# Patient Record
Sex: FEMALE | Race: WHITE | Employment: PART TIME | URBAN - METROPOLITAN AREA
[De-identification: names, ages, dates, MRNs, and addresses within clinical notes are randomized per-mention and may not be internally consistent; named-entity substitution may affect disease eponyms.]

---

## 2017-01-05 NOTE — PATIENT DISCUSSION
(H11.153) Pinguecula, bilateral - Assesment : Examination revealed Pinguecula OU. - Plan : Monitor for changes. Advised patient to call our office with decreased vision or increased symptoms.

## 2017-01-05 NOTE — PATIENT DISCUSSION
(U82.221) Other secondary cataract, bilateral - Assesment : s/p yag cap OU. - Plan : Monitor for changes. Advised patient to call our office with decreased vision or an increase in symptoms. Keep scheduled appt.

## 2018-01-09 NOTE — PATIENT DISCUSSION
(X52.868) Keratoconjunct sicca, not specified as Sjogren's, bilateral - Assesment : Examination revealed Dry Eye Syndrome OU. - Plan : Recommend PF artificial tears 3-4 times daily OU. Monitor for changes. Advised patient to call our office with decreased vision or increased symptoms.

## 2018-01-09 NOTE — PATIENT DISCUSSION
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 Wear sunglasses when outdoors and eat green, leafy vegetables to maintain ocular health.   Return to clinic in one year for Macular OCT and Exam.

## 2018-01-09 NOTE — PATIENT DISCUSSION
(J75.243) Vitreous degeneration, bilateral - Assesment : Examination revealed PVD - Plan : Monitor for changes. Advised patient to call our office with decreased vision or an increase in flashes and/or floaters.

## 2018-01-09 NOTE — PATIENT DISCUSSION
(H35.372) Puckering of macula, left eye - Assesment : Examination revealed ERM. Stable - Plan : Monitor. Advised patient to call our office with decreased vision or increased symptoms.

## 2018-01-16 ENCOUNTER — HOSPITAL ENCOUNTER (OUTPATIENT)
Dept: MAMMOGRAPHY | Age: 43
Discharge: HOME | End: 2018-01-16
Attending: NURSE PRACTITIONER
Payer: MEDICARE

## 2018-01-16 DIAGNOSIS — K50.00 REGIONAL ENTERITIS OF SMALL INTESTINE (HCC): ICD-10-CM

## 2018-01-16 DIAGNOSIS — K90.89 OTHER INTESTINAL MALABSORPTION (CODE): ICD-10-CM

## 2018-01-16 DIAGNOSIS — K59.1 DIARRHEA, FUNCTIONAL: ICD-10-CM

## 2018-01-16 PROCEDURE — 77080 DXA BONE DENSITY AXIAL: CPT

## 2018-01-25 ENCOUNTER — HOSPITAL ENCOUNTER (EMERGENCY)
Age: 43
Discharge: HOME HEALTH | End: 2018-01-25
Attending: EMERGENCY MEDICINE
Payer: MEDICARE

## 2018-01-25 VITALS
WEIGHT: 143.74 LBS | RESPIRATION RATE: 16 BRPM | OXYGEN SATURATION: 96 % | HEART RATE: 98 BPM | SYSTOLIC BLOOD PRESSURE: 143 MMHG | TEMPERATURE: 98.7 F | DIASTOLIC BLOOD PRESSURE: 79 MMHG | HEIGHT: 63 IN | BODY MASS INDEX: 25.47 KG/M2

## 2018-01-25 DIAGNOSIS — L50.9 HIVES: Primary | ICD-10-CM

## 2018-01-25 PROCEDURE — 74011250637 HC RX REV CODE- 250/637: Performed by: PHYSICIAN ASSISTANT

## 2018-01-25 PROCEDURE — 96372 THER/PROPH/DIAG INJ SC/IM: CPT

## 2018-01-25 PROCEDURE — 74011250636 HC RX REV CODE- 250/636: Performed by: PHYSICIAN ASSISTANT

## 2018-01-25 PROCEDURE — 99283 EMERGENCY DEPT VISIT LOW MDM: CPT

## 2018-01-25 RX ORDER — FAMOTIDINE 20 MG/1
20 TABLET, FILM COATED ORAL 2 TIMES DAILY
Qty: 20 TAB | Refills: 0 | Status: SHIPPED | OUTPATIENT
Start: 2018-01-25 | End: 2018-03-23 | Stop reason: CLARIF

## 2018-01-25 RX ORDER — DIPHENHYDRAMINE HYDROCHLORIDE 50 MG/ML
50 INJECTION, SOLUTION INTRAMUSCULAR; INTRAVENOUS
Status: COMPLETED | OUTPATIENT
Start: 2018-01-25 | End: 2018-01-25

## 2018-01-25 RX ORDER — HYDROXYZINE 50 MG/1
50 TABLET, FILM COATED ORAL
Qty: 20 TAB | Refills: 0 | Status: SHIPPED | OUTPATIENT
Start: 2018-01-25 | End: 2018-02-04

## 2018-01-25 RX ORDER — FAMOTIDINE 20 MG/1
20 TABLET, FILM COATED ORAL
Status: COMPLETED | OUTPATIENT
Start: 2018-01-25 | End: 2018-01-25

## 2018-01-25 RX ADMIN — FAMOTIDINE 20 MG: 20 TABLET, FILM COATED ORAL at 19:05

## 2018-01-25 RX ADMIN — DIPHENHYDRAMINE HYDROCHLORIDE 50 MG: 50 INJECTION, SOLUTION INTRAMUSCULAR; INTRAVENOUS at 19:05

## 2018-01-25 NOTE — ED TRIAGE NOTES
Pt reports having hives all over her body x 2 days. Seen at PCP this AM and prescribed prednisone pack and it seemed like it got worse. Went back this afternoon and got a steroid shot and not any better. States it seems like it is getting worse. Reports similar episode about 2 yrs ago and they thought could be related to her crohn's.

## 2018-01-25 NOTE — LETTER
[facility] 
[facility] EMERGENCY DEPT 
[address] [id number] [phone] Work/School Note Date: 1/25/2018 To Whom It May concern: 
 
Flor Serra was seen and treated today in the emergency room by the following provider(s): 
Attending Provider: Caesar Gonzales MD 
Physician Assistant: CHANO Pemberton. Flor Serra may return to work on 1/29/18.  
 
Sincerely, 
 
 
 
 
CHANO Pemberton

## 2018-01-25 NOTE — ED PROVIDER NOTES
HPI Comments: 38 y.o. female with no significant past medical history who presents from home via private vehicle with chief complaint of itching hives. Pt reports that she has had itching hives for he past 3 days that have gradually worsened. She has seen her PCP twice today for her Sx with no relief. She reports that initially she was given a prescription for prednisone to help with her Sx that she picked up and took at home, but continued to feel that her Sx were worsening promptiung her to return to her PCP. On her second visit she was given an injection of steroids for her Sx. However, after returning home she continued to have persistent itching that prompted her to come to the ED for further evaluation. She reports that she has had similar hives in the past and definitive source was never able to be identified at that time. She denies new soaps or detergents, new foods, fever, chills, nausea, vomiting, abdominal pain, CP, SOB, difficulty breathing, tongue swelling. There are no other acute medical concerns at this time. Social hx: Pt denies smoking cigarettes, drinking alcohol, or using any illicit substances. PCP: Otoniel Worrell MD  Note written by tiffany Merritt, as dictated by Jose Rogers PA-C 7:05 PM          The history is provided by the patient. Past Medical History:   Diagnosis Date    Bipolar 2 disorder ([address]     Colitis, ulcerative chronic (HCC)     Crohn disease ([address]     Depression     Epilepsy ([address]     Pancreatitis 9/13/2010    Seizure ([address]     Ulcerative colitis ([address] 9/13/2010       Past Surgical History:   Procedure Laterality Date    FLEXIBLE SIGMOIDOSCOPY N/A 10/26/2016    FLEXIBLE SIGMOIDOSCOPY  performed by Claudia Weiner MD at [address] [facility] HX APPENDECTOMY       [facility]    with J pouch    HX TONSIL AND ADENOIDECTOMY      age 6         History reviewed. No pertinent family history.     Social History     Social History    Marital status: SINGLE     Spouse name: N/A    Number of children: N/A    Years of education: N/A     Occupational History    Not on file. Social History Main Topics    Smoking status: Never Smoker    Smokeless tobacco: Not on file    Alcohol use No    Drug use: No    Sexual activity: Not on file     Other Topics Concern    Not on file     Social History Narrative         ALLERGIES: Vancomycin    Review of Systems   Constitutional: Negative for appetite change, chills, fatigue and fever. HENT: Negative for congestion, ear pain, postnasal drip, rhinorrhea, sneezing and sore throat. Eyes: Negative for redness and visual disturbance. Respiratory: Negative for cough, shortness of breath and wheezing. Cardiovascular: Negative for chest pain, palpitations and leg swelling. Gastrointestinal: Negative for abdominal pain, anal bleeding, constipation, diarrhea, nausea and vomiting. Genitourinary: Negative for difficulty urinating, dysuria, frequency and hematuria. Musculoskeletal: Negative for arthralgias, back pain, myalgias and neck stiffness. Skin: Positive for rash. Allergic/Immunologic: Negative for immunocompromised state. Neurological: Negative for dizziness, syncope, weakness, light-headedness and headaches. Hematological: Negative for adenopathy. Vitals:    01/25/18 1814 01/25/18 1934   BP: 144/70 143/79   Pulse: (!) 104 98   Resp: 20 16   Temp: 98.7 °F (37.1 °C)    SpO2: 99% 96%   Weight: 65.2 kg (143 lb 11.8 oz)    Height: 5' 3\" (1.6 m)             Physical Exam   Constitutional: She is oriented to person, place, and time. She appears well-developed and well-nourished. No distress. HENT:   Head: Normocephalic and atraumatic. Right Ear: External ear normal.   Left Ear: External ear normal.   Eyes: EOM are normal. Pupils are equal, round, and reactive to light. Neck: Neck supple. Cardiovascular: Normal rate, regular rhythm, normal heart sounds and intact distal pulses.   Exam reveals no gallop and no friction rub. No murmur heard. Pulmonary/Chest: Effort normal and breath sounds normal. No stridor. No respiratory distress. She has no wheezes. She has no rales. She exhibits no tenderness. Abdominal: Soft. Bowel sounds are normal. She exhibits no distension and no mass. There is no tenderness. There is no rebound and no guarding. Musculoskeletal: Normal range of motion. She exhibits no edema, tenderness or deformity. Neurological: She is alert and oriented to person, place, and time. No cranial nerve deficit. Coordination normal.   Skin: Rash noted. There is erythema. No pallor. Diffuse randomly spread erythematous maculopapular eruption with rounded borders + blanching appearing in clusters + urticarial   Hands reddened. Pt rubbing hands together during exam. + large linear excoriations noted to forearms. Psychiatric: She has a normal mood and affect. Her behavior is normal.   Nursing note and vitals reviewed. MDM  Number of Diagnoses or Management Options  Hives:      Amount and/or Complexity of Data Reviewed  Review and summarize past medical records: yes  Independent visualization of images, tracings, or specimens: yes    Patient Progress  Patient progress: stable    ED Course       Procedures    6:53 PM  Discussed pt, sx, hx and current findings with Dr Cinthia Aguirre. He is in agreement with plan. Will give injection of benadryl, pepcid po and ice. Pt has received two doses of steroids prior to arrival. No other steroids to be given. Jennifer Coates. ASHLEY Humphreys      7:48 PM   Pt notes she is not much better after meds, but is ready to go home. Pt notes new areas popped up on forearms, but hands are no longer red. + large excoriations to rounded hives, pt denies scratching. Jennifer Coates. ASHLEY Humphreys    LABORATORY TESTS:  No results found for this or any previous visit (from the past 12 hour(s)). IMAGING RESULTS:    No results found.     MEDICATIONS GIVEN:  Medications diphenhydrAMINE (BENADRYL) injection 50 mg (50 mg IntraMUSCular Given 1/25/18 1905)   famotidine (PEPCID) tablet 20 mg (20 mg Oral Given 1/25/18 1905)       IMPRESSION:  1. Hives        PLAN:  1. Discharge Medication List as of 1/25/2018  7:48 PM      START taking these medications    Details   famotidine (PEPCID) 20 mg tablet Take 1 Tab by mouth two (2) times a day., Print, Disp-20 Tab, R-0      hydrOXYzine HCl (ATARAX) 50 mg tablet Take 1 Tab by mouth every six (6) hours as needed for Itching for up to 10 days. , Print, Disp-20 Tab, R-0         CONTINUE these medications which have NOT CHANGED    Details   buPROPion (WELLBUTRIN) 100 mg tablet Take 100 mg by mouth two (2) times a day., Historical Med      dextroamphetamine-amphetamine (ADDERALL) 10 mg tablet Take 10 mg by mouth two (2) times a day., Historical Med      QUEtiapine (SEROQUEL) 300 mg tablet Take 300 mg by mouth two (2) times a day., Historical Med      DULoxetine (CYMBALTA) 60 mg capsule Take 60 mg by mouth two (2) times a day.  , Historical Med      lamoTRIgine (LAMICTAL) 100 mg tablet Take 100 mg by mouth two (2) times a day.  , Historical Med      levetiracetam (KEPPRA) 100 mg/mL solution Take 10 mg/kg by mouth two (2) times a day. 1500 mg, Historical Med      adalimumab (HUMIRA) 40 mg/0.8 mL injection by SubCUTAneous route once. Every other week saturdays due 11/9/13, Historical Med      ondansetron (ZOFRAN ODT) 4 mg disintegrating tablet Take 1 Tab by mouth every eight (8) hours as needed for Nausea. , Print, Disp-10 Tab, R-0           2. Follow-up Information     Follow up With Details Comments Contact Info    Bailey Tim MD Schedule an appointment as soon as possible for a visit 2-4 days for recheck [address]  [phone]          Return to ED if worse       7:49 PM  Pt has been reexamined. Pt has no new complaints, changes or physical findings. Care plan outlined and precautions discussed.  All available results were reviewed with pt. All medications were reviewed with pt. All of pt's questions and concerns were addressed. Pt agrees to F/U as instructed and agrees to return to ED upon further deterioration. Pt is ready to go home.   CHANO Chen

## 2018-01-26 NOTE — ED NOTES
Patient discharged by provider Thuan Coles, [address]. Ambulatory from ED with friend. Steady gait.

## 2018-01-26 NOTE — DISCHARGE INSTRUCTIONS
Hives: Care Instructions  Your Care Instructions  Hives are raised, red, itchy patches of skin. They are also called wheals or welts. They usually have red borders and pale centers. Hives range in size from ¼ inch to 3 inches or more across. They may seem to move from place to place on the skin. Several hives may form a large area of raised, red skin. You can get hives after an insect sting, after taking medicine or eating certain foods, or because of infection or stress. Other causes include plants, things you breathe in, makeup, heat, cold, sunlight, and latex. You cannot spread hives to other people. Hives may last a few minutes or a few days, but a single spot may last less than 36 hours. Follow-up care is a key part of your treatment and safety. Be sure to make and go to all appointments, and call your doctor if you are having problems. It's also a good idea to know your test results and keep a list of the medicines you take. How can you care for yourself at home? · Avoid whatever you think may have caused your hives, such as a certain food or medicine. However, you may not know the cause. · Put a cool, wet towel on the area to relieve itching. · Take an over-the-counter antihistamine, such as diphenhydramine (Benadryl), cetirizine (Zyrtec), or loratadine (Claritin), to help stop the hives and calm the itching. Read and follow directions on the label. These medicines can make you feel sleepy. Do not drive while using them. · Stay away from strong soaps, detergents, and chemicals. These can make itching worse. When should you call for help? Call 911 anytime you think you may need emergency care. For example, call if:  ? · You have symptoms of a severe allergic reaction. These may include:  ¨ Sudden raised, red areas (hives) all over your body. ¨ Swelling of the throat, mouth, lips, or tongue. ¨ Trouble breathing. ¨ Passing out (losing consciousness).  Or you may feel very lightheaded or suddenly feel weak, confused, or restless. ?Call your doctor now or seek immediate medical care if:  ? · You have symptoms of an allergic reaction, such as:  ¨ A rash or hives (raised, red areas on the skin). ¨ Itching. ¨ Swelling. ¨ Belly pain, nausea, or vomiting. ? · You get hives after you start a new medicine. ? · Hives have not gone away after 24 hours. ? Watch closely for changes in your health, and be sure to contact your doctor if:  ? · You do not get better as expected. Where can you learn more? Go to http://shayan-jarek.info/. Enter U940 in the search box to learn more about \"Hives: Care Instructions. \"  Current as of: March 20, 2017  Content Version: 11.4  © 0497-7603 [employer]. Care instructions adapted under license by [employer] (which disclaims liability or warranty for this information). If you have questions about a medical condition or this instruction, always ask your healthcare professional. [place] [address] any warranty or liability for your use of this information. We hope that we have addressed all of your medical concerns. The examination and treatment you received in the Emergency Department were for an emergent problem and were not intended as complete care. It is important that you follow up with your healthcare provider(s) for ongoing care. If your symptoms worsen or do not improve as expected, and you are unable to reach your usual health care provider(s), you should return to the Emergency Department. Today's healthcare is undergoing tremendous change, and patient satisfaction surveys are one of the many tools to assess the quality of medical care. You may receive a survey from the [employer] organization regarding your experience in the Emergency Department. I hope that your experience has been completely positive, particularly the medical care that I provided.   As such, please participate in the survey; anything less than excellent does not meet my expectations or intentions. [facility] and [facility] participate in nationally recognized quality of care measures. If your blood pressure is greater than 120/80, as reported below, we urge that you seek medical care to address the potential of high blood pressure, commonly known as hypertension. Hypertension can be hereditary or can be caused by certain medical conditions, pain, stress, or \"white coat syndrome. \"       Please make an appointment with your health care provider(s) for follow up of your Emergency Department visit. VITALS:   Patient Vitals for the past 8 hrs:   Temp Pulse Resp BP SpO2   01/25/18 1934 - 98 16 143/79 96 %   01/25/18 1814 98.7 °F (37.1 °C) (!) 104 20 144/70 99 %          Thank you for allowing us to provide you with medical care today. We realize that you have many choices for your emergency care needs. Please choose us in the future for any continued health care needs. Jensen Humphreys, [employer].   Office: [phone]

## 2018-03-23 ENCOUNTER — HOSPITAL ENCOUNTER (EMERGENCY)
Age: 43
Discharge: HOME | End: 2018-03-23
Attending: STUDENT IN AN ORGANIZED HEALTH CARE EDUCATION/TRAINING PROGRAM
Payer: MEDICARE

## 2018-03-23 ENCOUNTER — APPOINTMENT (OUTPATIENT)
Dept: CT IMAGING | Age: 43
End: 2018-03-23
Attending: STUDENT IN AN ORGANIZED HEALTH CARE EDUCATION/TRAINING PROGRAM
Payer: MEDICARE

## 2018-03-23 VITALS
TEMPERATURE: 98.2 F | RESPIRATION RATE: 14 BRPM | OXYGEN SATURATION: 100 % | DIASTOLIC BLOOD PRESSURE: 68 MMHG | WEIGHT: 130 LBS | HEART RATE: 89 BPM | BODY MASS INDEX: 25.52 KG/M2 | SYSTOLIC BLOOD PRESSURE: 122 MMHG | HEIGHT: 60 IN

## 2018-03-23 DIAGNOSIS — R10.84 ABDOMINAL PAIN, GENERALIZED: Primary | ICD-10-CM

## 2018-03-23 DIAGNOSIS — R30.0 DYSURIA: ICD-10-CM

## 2018-03-23 LAB
ALBUMIN SERPL-MCNC: 4.3 G/DL (ref 3.5–5)
ALBUMIN/GLOB SERPL: 1.2 {RATIO} (ref 1.1–2.2)
ALP SERPL-CCNC: 67 U/L (ref 45–117)
ALT SERPL-CCNC: 25 U/L (ref 12–78)
ANION GAP SERPL CALC-SCNC: 9 MMOL/L (ref 5–15)
APPEARANCE UR: ABNORMAL
AST SERPL-CCNC: 17 U/L (ref 15–37)
BACTERIA URNS QL MICRO: ABNORMAL /HPF
BASOPHILS # BLD: 0 K/UL (ref 0–0.1)
BASOPHILS NFR BLD: 0 % (ref 0–1)
BILIRUB SERPL-MCNC: 0.3 MG/DL (ref 0.2–1)
BILIRUB UR QL: NEGATIVE
BUN SERPL-MCNC: 7 MG/DL (ref 6–20)
BUN/CREAT SERPL: 8 (ref 12–20)
CALCIUM SERPL-MCNC: 8.9 MG/DL (ref 8.5–10.1)
CHLORIDE SERPL-SCNC: 103 MMOL/L (ref 97–108)
CO2 SERPL-SCNC: 27 MMOL/L (ref 21–32)
COLOR UR: ABNORMAL
CREAT SERPL-MCNC: 0.93 MG/DL (ref 0.55–1.02)
DIFFERENTIAL METHOD BLD: ABNORMAL
EOSINOPHIL # BLD: 0.2 K/UL (ref 0–0.4)
EOSINOPHIL NFR BLD: 2 % (ref 0–7)
EPITH CASTS URNS QL MICRO: ABNORMAL /LPF
ERYTHROCYTE [DISTWIDTH] IN BLOOD BY AUTOMATED COUNT: 11.3 % (ref 11.5–14.5)
GLOBULIN SER CALC-MCNC: 3.5 G/DL (ref 2–4)
GLUCOSE SERPL-MCNC: 92 MG/DL (ref 65–100)
GLUCOSE UR STRIP.AUTO-MCNC: NEGATIVE MG/DL
HCG UR QL: NEGATIVE
HCT VFR BLD AUTO: 43.3 % (ref 35–47)
HGB BLD-MCNC: 14.7 G/DL (ref 11.5–16)
HGB UR QL STRIP: NEGATIVE
IMM GRANULOCYTES # BLD: 0 K/UL (ref 0–0.04)
IMM GRANULOCYTES NFR BLD AUTO: 0 % (ref 0–0.5)
KETONES UR QL STRIP.AUTO: NEGATIVE MG/DL
LEUKOCYTE ESTERASE UR QL STRIP.AUTO: ABNORMAL
LIPASE SERPL-CCNC: 58 U/L (ref 73–393)
LYMPHOCYTES # BLD: 2.5 K/UL (ref 0.8–3.5)
LYMPHOCYTES NFR BLD: 29 % (ref 12–49)
MAGNESIUM SERPL-MCNC: 1.9 MG/DL (ref 1.6–2.4)
MCH RBC QN AUTO: 32.2 PG (ref 26–34)
MCHC RBC AUTO-ENTMCNC: 33.9 G/DL (ref 30–36.5)
MCV RBC AUTO: 95 FL (ref 80–99)
MONOCYTES # BLD: 0.6 K/UL (ref 0–1)
MONOCYTES NFR BLD: 8 % (ref 5–13)
NEUTS SEG # BLD: 5.2 K/UL (ref 1.8–8)
NEUTS SEG NFR BLD: 61 % (ref 32–75)
NITRITE UR QL STRIP.AUTO: NEGATIVE
NRBC # BLD: 0 K/UL (ref 0–0.01)
NRBC BLD-RTO: 0 PER 100 WBC
PH UR STRIP: 7 [PH] (ref 5–8)
PLATELET # BLD AUTO: 351 K/UL (ref 150–400)
PMV BLD AUTO: 8.8 FL (ref 8.9–12.9)
POTASSIUM SERPL-SCNC: 4.2 MMOL/L (ref 3.5–5.1)
PROT SERPL-MCNC: 7.8 G/DL (ref 6.4–8.2)
PROT UR STRIP-MCNC: ABNORMAL MG/DL
RBC # BLD AUTO: 4.56 M/UL (ref 3.8–5.2)
RBC #/AREA URNS HPF: ABNORMAL /HPF (ref 0–5)
SODIUM SERPL-SCNC: 139 MMOL/L (ref 136–145)
SP GR UR REFRACTOMETRY: 1.02 (ref 1–1.03)
UR CULT HOLD, URHOLD: NORMAL
UROBILINOGEN UR QL STRIP.AUTO: 0.2 EU/DL (ref 0.2–1)
WBC # BLD AUTO: 8.5 K/UL (ref 3.6–11)
WBC URNS QL MICRO: ABNORMAL /HPF (ref 0–4)

## 2018-03-23 PROCEDURE — 81001 URINALYSIS AUTO W/SCOPE: CPT | Performed by: STUDENT IN AN ORGANIZED HEALTH CARE EDUCATION/TRAINING PROGRAM

## 2018-03-23 PROCEDURE — 87086 URINE CULTURE/COLONY COUNT: CPT | Performed by: STUDENT IN AN ORGANIZED HEALTH CARE EDUCATION/TRAINING PROGRAM

## 2018-03-23 PROCEDURE — 74177 CT ABD & PELVIS W/CONTRAST: CPT

## 2018-03-23 PROCEDURE — 85025 COMPLETE CBC W/AUTO DIFF WBC: CPT | Performed by: STUDENT IN AN ORGANIZED HEALTH CARE EDUCATION/TRAINING PROGRAM

## 2018-03-23 PROCEDURE — 36415 COLL VENOUS BLD VENIPUNCTURE: CPT | Performed by: STUDENT IN AN ORGANIZED HEALTH CARE EDUCATION/TRAINING PROGRAM

## 2018-03-23 PROCEDURE — 74011636320 HC RX REV CODE- 636/320: Performed by: RADIOLOGY

## 2018-03-23 PROCEDURE — 81025 URINE PREGNANCY TEST: CPT

## 2018-03-23 PROCEDURE — 80053 COMPREHEN METABOLIC PANEL: CPT | Performed by: STUDENT IN AN ORGANIZED HEALTH CARE EDUCATION/TRAINING PROGRAM

## 2018-03-23 PROCEDURE — 99284 EMERGENCY DEPT VISIT MOD MDM: CPT

## 2018-03-23 PROCEDURE — 74011250636 HC RX REV CODE- 250/636: Performed by: STUDENT IN AN ORGANIZED HEALTH CARE EDUCATION/TRAINING PROGRAM

## 2018-03-23 PROCEDURE — 83735 ASSAY OF MAGNESIUM: CPT | Performed by: STUDENT IN AN ORGANIZED HEALTH CARE EDUCATION/TRAINING PROGRAM

## 2018-03-23 PROCEDURE — 96360 HYDRATION IV INFUSION INIT: CPT

## 2018-03-23 PROCEDURE — 83690 ASSAY OF LIPASE: CPT | Performed by: STUDENT IN AN ORGANIZED HEALTH CARE EDUCATION/TRAINING PROGRAM

## 2018-03-23 RX ORDER — KETOROLAC TROMETHAMINE 10 MG/1
10 TABLET, FILM COATED ORAL
Qty: 12 TAB | Refills: 0 | Status: SHIPPED | OUTPATIENT
Start: 2018-03-23 | End: 2018-06-19

## 2018-03-23 RX ORDER — KETOROLAC TROMETHAMINE 30 MG/ML
15 INJECTION, SOLUTION INTRAMUSCULAR; INTRAVENOUS ONCE
Status: DISCONTINUED | OUTPATIENT
Start: 2018-03-23 | End: 2018-03-23 | Stop reason: HOSPADM

## 2018-03-23 RX ADMIN — IOPAMIDOL 94 ML: 755 INJECTION, SOLUTION INTRAVENOUS at 12:30

## 2018-03-23 RX ADMIN — SODIUM CHLORIDE 1000 ML: 900 INJECTION, SOLUTION INTRAVENOUS at 11:38

## 2018-03-23 NOTE — DISCHARGE INSTRUCTIONS
Painful Urination (Dysuria): Care Instructions  Your Care Instructions  Burning pain with urination (dysuria) is a common symptom of a urinary tract infection or other urinary problems. The bladder may become inflamed. This can cause pain when the bladder fills and empties. You may also feel pain if the tube that carries urine from the bladder to the outside of the body (urethra) gets irritated or infected. Sexually transmitted infections (STIs) also may cause pain when you urinate. Sometimes the pain can be caused by things other than an infection. The urethra can be irritated by soaps, perfumes, or foreign objects in the urethra. Kidney stones can cause pain when they pass through the urethra. The cause may be hard to find. You may need tests. Treatment for painful urination depends on the cause. Follow-up care is a key part of your treatment and safety. Be sure to make and go to all appointments, and call your doctor if you are having problems. It's also a good idea to know your test results and keep a list of the medicines you take. How can you care for yourself at home? · Drink extra water for the next day or two. This will help make the urine less concentrated. (If you have kidney, heart, or liver disease and have to limit fluids, talk with your doctor before you increase the amount of fluids you drink.)  · Avoid drinks that are carbonated or have caffeine. They can irritate the bladder. · Urinate often. Try to empty your bladder each time. For women:  · Urinate right after you have sex. · After going to the bathroom, wipe from front to back. · Avoid douches, bubble baths, and feminine hygiene sprays. And avoid other feminine hygiene products that have deodorants. When should you call for help? Call your doctor now or seek immediate medical care if:  ? · You have new symptoms, such as fever, nausea, or vomiting. ? · You have new or worse symptoms of a urinary problem.  For example:  Jm Bernard have blood or pus in your urine. ¨ You have chills or body aches. ¨ It hurts worse to urinate. ¨ You have groin or belly pain. ¨ You have pain in your back just below your rib cage (the flank area). ? Watch closely for changes in your health, and be sure to contact your doctor if you have any problems. Where can you learn more? Go to http://shayan-jarek.info/. Enter Z471 in the search box to learn more about \"Painful Urination (Dysuria): Care Instructions. \"  Current as of: May 12, 2017  Content Version: 11.4  © 3298-0205 [employer]. Care instructions adapted under license by [employer] (which disclaims liability or warranty for this information). If you have questions about a medical condition or this instruction, always ask your healthcare professional. [address] any warranty or liability for your use of this information. Abdominal Pain: Care Instructions  Your Care Instructions    Abdominal pain has many possible causes. Some aren't serious and get better on their own in a few days. Others need more testing and treatment. If your pain continues or gets worse, you need to be rechecked and may need more tests to find out what is wrong. You may need surgery to correct the problem. Don't ignore new symptoms, such as fever, nausea and vomiting, urination problems, pain that gets worse, and dizziness. These may be signs of a more serious problem. Your doctor may have recommended a follow-up visit in the next 8 to 12 hours. If you are not getting better, you may need more tests or treatment. The doctor has checked you carefully, but problems can develop later. If you notice any problems or new symptoms, get medical treatment right away. Follow-up care is a key part of your treatment and safety. Be sure to make and go to all appointments, and call your doctor if you are having problems.  It's also a good idea to know your test results and keep a list of the medicines you take. How can you care for yourself at home? · Rest until you feel better. · To prevent dehydration, drink plenty of fluids, enough so that your urine is light yellow or clear like water. Choose water and other caffeine-free clear liquids until you feel better. If you have kidney, heart, or liver disease and have to limit fluids, talk with your doctor before you increase the amount of fluids you drink. · If your stomach is upset, eat mild foods, such as rice, dry toast or crackers, bananas, and applesauce. Try eating several small meals instead of two or three large ones. · Wait until 48 hours after all symptoms have gone away before you have spicy foods, alcohol, and drinks that contain caffeine. · Do not eat foods that are high in fat. · Avoid anti-inflammatory medicines such as aspirin, ibuprofen (Advil, Motrin), and naproxen (Aleve). These can cause stomach upset. Talk to your doctor if you take daily aspirin for another health problem. When should you call for help? Call 911 anytime you think you may need emergency care. For example, call if:  ? · You passed out (lost consciousness). ? · You pass maroon or very bloody stools. ? · You vomit blood or what looks like coffee grounds. ? · You have new, severe belly pain. ?Call your doctor now or seek immediate medical care if:  ? · Your pain gets worse, especially if it becomes focused in one area of your belly. ? · You have a new or higher fever. ? · Your stools are black and look like tar, or they have streaks of blood. ? · You have unexpected vaginal bleeding. ? · You have symptoms of a urinary tract infection. These may include:  ¨ Pain when you urinate. ¨ Urinating more often than usual.  ¨ Blood in your urine. ? · You are dizzy or lightheaded, or you feel like you may faint. ? Watch closely for changes in your health, and be sure to contact your doctor if:  ? · You are not getting better after 1 day (24 hours). Where can you learn more? Go to http://shayan-jarek.info/. Enter C662 in the search box to learn more about \"Abdominal Pain: Care Instructions. \"  Current as of: March 20, 2017  Content Version: 11.4  © 5083-2060 [employer]. Care instructions adapted under license by [employer] (which disclaims liability or warranty for this information). If you have questions about a medical condition or this instruction, always ask your healthcare professional. [address] any warranty or liability for your use of this information.

## 2018-03-23 NOTE — ED TRIAGE NOTES
Has been on Macrobid x 6 days for UTI and no improvement, went to [facility] last night and changed to Cipro. Pain started radiating into back this morning and she is feeling worse.

## 2018-03-23 NOTE — ED PROVIDER NOTES
Patient is a 38 y.o. female presenting with urinary tract infection. The history is provided by the patient. Bladder Infection    This is a new problem. The current episode started more than 2 days ago (5 days ago). The problem occurs every urination. The problem has not changed since onset. The quality of the pain is described as burning. The pain is at a severity of 7/10. The pain is moderate. There has been no fever. Associated symptoms include nausea, frequency, hesitancy, urgency and flank pain (bilateral). Pertinent negatives include no chills, no vomiting, no hematuria and no possible pregnancy. She has tried antibiotics (Was on Macrobid since Sunday (5 days total), put on Cipro last night at [facility] for the symptoms. Her past medical history is significant for recurrent UTIs. Past medical history comments: Crohn's. Past Medical History:   Diagnosis Date    B[facility] 2 disorder ([facility]     Colitis, ulcerative chronic (HCC)     Crohn disease ([facility]     Depression     Epilepsy ([facility]     Pancreatitis 9/13/2010    Seizure ([facility]     Ulcerative colitis ([facility] [address] 9/13/2010       Past Surgical History:   Procedure Laterality Date    FLEXIBLE SIGMOIDOSCOPY N/A 10/26/2016    FLEXIBLE SIGMOIDOSCOPY  performed by Serge Merritt MD at [address] [facility] HX APPENDECTOMY       [facility]    with J pouch    HX TONSIL AND ADENOIDECTOMY      age 6         No family history on file. Social History     Social History    Marital status: SINGLE     Spouse name: N/A    Number of children: N/A    Years of education: N/A     Occupational History    Not on file. Social History Main Topics    Smoking status: Never Smoker    Smokeless tobacco: Not on file    Alcohol use No    Drug use: No    Sexual activity: Not on file     Other Topics Concern    Not on file     Social History Narrative         ALLERGIES: Vancomycin    Review of Systems   Constitutional: Negative for chills and fever.    HENT: Negative for sore throat. Respiratory: Negative for cough and shortness of breath. Cardiovascular: Negative for chest pain. Gastrointestinal: Positive for nausea. Negative for abdominal pain, constipation and vomiting. Genitourinary: Positive for dysuria, flank pain (bilateral), frequency, hesitancy and urgency. Negative for hematuria. Musculoskeletal: Positive for back pain. Skin: Negative for rash. Neurological: Negative for syncope and headaches. Psychiatric/Behavioral: Negative for confusion. All other systems reviewed and are negative. Vitals:    03/23/18 1100   BP: 121/60   Pulse: 89   Resp: 14   Temp: 98.2 °F (36.8 °C)   SpO2: 100%   Weight: 59 kg (130 lb)   Height: 5' (1.524 m)            Physical Exam   Constitutional: She is oriented to person, place, and time. She appears well-developed. No distress. HENT:   Head: Normocephalic and atraumatic. Eyes: Conjunctivae and EOM are normal. Pupils are equal, round, and reactive to light. Neck: Normal range of motion. Neck supple. Cardiovascular: Normal rate, regular rhythm and normal heart sounds. No murmur heard. Pulmonary/Chest: Effort normal and breath sounds normal. No respiratory distress. Abdominal: Soft. Bowel sounds are normal. She exhibits no distension. There is tenderness (mild, diffuse, nonfocal). There is no rebound. Mild bilaterally CVA TTP. Musculoskeletal: Normal range of motion. She exhibits no edema. Neurological: She is alert and oriented to person, place, and time. No cranial nerve deficit. She exhibits normal muscle tone. Coordination normal.   Skin: Skin is warm and dry. No rash noted. Psychiatric: She has a normal mood and affect. Her behavior is normal.   Nursing note and vitals reviewed.        MDM      ED Course       Procedures

## 2018-03-25 LAB
BACTERIA SPEC CULT: NORMAL
CC UR VC: NORMAL
SERVICE CMNT-IMP: NORMAL

## 2018-06-14 ENCOUNTER — HOSPITAL ENCOUNTER (OUTPATIENT)
Dept: CT IMAGING | Age: 43
Discharge: HOME | End: 2018-06-14
Attending: NURSE PRACTITIONER
Payer: MEDICARE

## 2018-06-14 DIAGNOSIS — K50.90 CROHN'S DISEASE (HCC): ICD-10-CM

## 2018-06-14 PROCEDURE — 74011636320 HC RX REV CODE- 636/320: Performed by: RADIOLOGY

## 2018-06-14 PROCEDURE — 74177 CT ABD & PELVIS W/CONTRAST: CPT

## 2018-06-14 RX ADMIN — IOPAMIDOL 98 ML: 755 INJECTION, SOLUTION INTRAVENOUS at 09:35

## 2018-06-19 ENCOUNTER — HOSPITAL ENCOUNTER (INPATIENT)
Age: 43
LOS: 5 days | Discharge: HOME | DRG: 386 | End: 2018-06-24
Attending: EMERGENCY MEDICINE | Admitting: INTERNAL MEDICINE
Payer: MEDICARE

## 2018-06-19 ENCOUNTER — HOSPITAL ENCOUNTER (OUTPATIENT)
Dept: GENERAL RADIOLOGY | Age: 43
Discharge: HOME | DRG: 386 | End: 2018-06-19
Attending: INTERNAL MEDICINE
Payer: MEDICARE

## 2018-06-19 DIAGNOSIS — K50.019 CROHN'S DISEASE OF SMALL INTESTINE WITH COMPLICATION (HCC): ICD-10-CM

## 2018-06-19 DIAGNOSIS — K50.90 CROHN DISEASE (HCC): ICD-10-CM

## 2018-06-19 DIAGNOSIS — R10.84 ABDOMINAL PAIN, GENERALIZED: Primary | ICD-10-CM

## 2018-06-19 PROBLEM — F32.A DEPRESSION: Chronic | Status: ACTIVE | Noted: 2018-06-19

## 2018-06-19 PROBLEM — G40.909 EPILEPSY (HCC): Chronic | Status: ACTIVE | Noted: 2018-06-19

## 2018-06-19 LAB
ANION GAP SERPL CALC-SCNC: 7 MMOL/L (ref 5–15)
BASOPHILS # BLD: 0 K/UL (ref 0–0.1)
BASOPHILS NFR BLD: 0 % (ref 0–1)
BUN SERPL-MCNC: 8 MG/DL (ref 6–20)
BUN/CREAT SERPL: 10 (ref 12–20)
CALCIUM SERPL-MCNC: 8.9 MG/DL (ref 8.5–10.1)
CHLORIDE SERPL-SCNC: 105 MMOL/L (ref 97–108)
CO2 SERPL-SCNC: 26 MMOL/L (ref 21–32)
CREAT SERPL-MCNC: 0.8 MG/DL (ref 0.55–1.02)
DIFFERENTIAL METHOD BLD: ABNORMAL
EOSINOPHIL # BLD: 0.2 K/UL (ref 0–0.4)
EOSINOPHIL NFR BLD: 2 % (ref 0–7)
ERYTHROCYTE [DISTWIDTH] IN BLOOD BY AUTOMATED COUNT: 11.3 % (ref 11.5–14.5)
GLUCOSE SERPL-MCNC: 89 MG/DL (ref 65–100)
HCT VFR BLD AUTO: 38.4 % (ref 35–47)
HGB BLD-MCNC: 13.3 G/DL (ref 11.5–16)
IMM GRANULOCYTES # BLD: 0 K/UL (ref 0–0.04)
IMM GRANULOCYTES NFR BLD AUTO: 0 % (ref 0–0.5)
LYMPHOCYTES # BLD: 2.9 K/UL (ref 0.8–3.5)
LYMPHOCYTES NFR BLD: 25 % (ref 12–49)
MCH RBC QN AUTO: 32 PG (ref 26–34)
MCHC RBC AUTO-ENTMCNC: 34.6 G/DL (ref 30–36.5)
MCV RBC AUTO: 92.3 FL (ref 80–99)
MONOCYTES # BLD: 0.8 K/UL (ref 0–1)
MONOCYTES NFR BLD: 7 % (ref 5–13)
NEUTS SEG # BLD: 7.6 K/UL (ref 1.8–8)
NEUTS SEG NFR BLD: 66 % (ref 32–75)
NRBC # BLD: 0 K/UL (ref 0–0.01)
NRBC BLD-RTO: 0 PER 100 WBC
PLATELET # BLD AUTO: 266 K/UL (ref 150–400)
PMV BLD AUTO: 9 FL (ref 8.9–12.9)
POTASSIUM SERPL-SCNC: 3.4 MMOL/L (ref 3.5–5.1)
RBC # BLD AUTO: 4.16 M/UL (ref 3.8–5.2)
SODIUM SERPL-SCNC: 138 MMOL/L (ref 136–145)
WBC # BLD AUTO: 11.6 K/UL (ref 3.6–11)

## 2018-06-19 PROCEDURE — 36415 COLL VENOUS BLD VENIPUNCTURE: CPT | Performed by: EMERGENCY MEDICINE

## 2018-06-19 PROCEDURE — 74011250637 HC RX REV CODE- 250/637: Performed by: INTERNAL MEDICINE

## 2018-06-19 PROCEDURE — 94762 N-INVAS EAR/PLS OXIMTRY CONT: CPT

## 2018-06-19 PROCEDURE — 77030027138 HC INCENT SPIROMETER -A

## 2018-06-19 PROCEDURE — 80048 BASIC METABOLIC PNL TOTAL CA: CPT | Performed by: EMERGENCY MEDICINE

## 2018-06-19 PROCEDURE — 74250 X-RAY XM SM INT 1CNTRST STD: CPT

## 2018-06-19 PROCEDURE — 74011250636 HC RX REV CODE- 250/636: Performed by: EMERGENCY MEDICINE

## 2018-06-19 PROCEDURE — 99282 EMERGENCY DEPT VISIT SF MDM: CPT

## 2018-06-19 PROCEDURE — 65270000029 HC RM PRIVATE

## 2018-06-19 PROCEDURE — 74011250636 HC RX REV CODE- 250/636: Performed by: INTERNAL MEDICINE

## 2018-06-19 PROCEDURE — 74011000258 HC RX REV CODE- 258: Performed by: EMERGENCY MEDICINE

## 2018-06-19 PROCEDURE — 85025 COMPLETE CBC W/AUTO DIFF WBC: CPT | Performed by: EMERGENCY MEDICINE

## 2018-06-19 RX ORDER — NALOXONE HYDROCHLORIDE 0.4 MG/ML
0.4 INJECTION, SOLUTION INTRAMUSCULAR; INTRAVENOUS; SUBCUTANEOUS AS NEEDED
Status: DISCONTINUED | OUTPATIENT
Start: 2018-06-19 | End: 2018-06-24 | Stop reason: HOSPADM

## 2018-06-19 RX ORDER — SODIUM CHLORIDE 0.9 % (FLUSH) 0.9 %
5-10 SYRINGE (ML) INJECTION AS NEEDED
Status: DISCONTINUED | OUTPATIENT
Start: 2018-06-19 | End: 2018-06-24 | Stop reason: HOSPADM

## 2018-06-19 RX ORDER — SODIUM CHLORIDE 9 MG/ML
125 INJECTION, SOLUTION INTRAVENOUS CONTINUOUS
Status: DISCONTINUED | OUTPATIENT
Start: 2018-06-19 | End: 2018-06-19

## 2018-06-19 RX ORDER — DICYCLOMINE HYDROCHLORIDE 20 MG/1
20 TABLET ORAL
Status: DISCONTINUED | OUTPATIENT
Start: 2018-06-19 | End: 2018-06-24 | Stop reason: HOSPADM

## 2018-06-19 RX ORDER — QUETIAPINE FUMARATE 100 MG/1
100 TABLET, FILM COATED ORAL
COMMUNITY
End: 2018-06-19

## 2018-06-19 RX ORDER — ONDANSETRON 2 MG/ML
4 INJECTION INTRAMUSCULAR; INTRAVENOUS
Status: DISCONTINUED | OUTPATIENT
Start: 2018-06-19 | End: 2018-06-24 | Stop reason: HOSPADM

## 2018-06-19 RX ORDER — SODIUM CHLORIDE 0.9 % (FLUSH) 0.9 %
5-10 SYRINGE (ML) INJECTION EVERY 8 HOURS
Status: DISCONTINUED | OUTPATIENT
Start: 2018-06-19 | End: 2018-06-24 | Stop reason: HOSPADM

## 2018-06-19 RX ORDER — DICYCLOMINE HYDROCHLORIDE 20 MG/1
20 TABLET ORAL
COMMUNITY

## 2018-06-19 RX ORDER — LEVOFLOXACIN 5 MG/ML
750 INJECTION, SOLUTION INTRAVENOUS
Status: DISCONTINUED | OUTPATIENT
Start: 2018-06-19 | End: 2018-06-19

## 2018-06-19 RX ORDER — BUPROPION HYDROCHLORIDE 150 MG/1
150 TABLET, EXTENDED RELEASE ORAL DAILY
Status: DISCONTINUED | OUTPATIENT
Start: 2018-06-20 | End: 2018-06-24 | Stop reason: HOSPADM

## 2018-06-19 RX ORDER — GLYCOPYRROLATE 1 MG/1
1 TABLET ORAL 2 TIMES DAILY
Status: DISCONTINUED | OUTPATIENT
Start: 2018-06-19 | End: 2018-06-24 | Stop reason: HOSPADM

## 2018-06-19 RX ORDER — CHOLECALCIFEROL (VITAMIN D3) 125 MCG
10000 CAPSULE ORAL DAILY
COMMUNITY

## 2018-06-19 RX ORDER — HYDROMORPHONE HYDROCHLORIDE 2 MG/ML
1 INJECTION, SOLUTION INTRAMUSCULAR; INTRAVENOUS; SUBCUTANEOUS
Status: DISCONTINUED | OUTPATIENT
Start: 2018-06-19 | End: 2018-06-19

## 2018-06-19 RX ORDER — ONDANSETRON 2 MG/ML
4 INJECTION INTRAMUSCULAR; INTRAVENOUS
Status: DISCONTINUED | OUTPATIENT
Start: 2018-06-19 | End: 2018-06-19

## 2018-06-19 RX ORDER — METRONIDAZOLE 500 MG/100ML
500 INJECTION, SOLUTION INTRAVENOUS
Status: DISCONTINUED | OUTPATIENT
Start: 2018-06-19 | End: 2018-06-19

## 2018-06-19 RX ORDER — DEXTROSE, SODIUM CHLORIDE, AND POTASSIUM CHLORIDE 5; .45; .15 G/100ML; G/100ML; G/100ML
100 INJECTION INTRAVENOUS CONTINUOUS
Status: DISCONTINUED | OUTPATIENT
Start: 2018-06-19 | End: 2018-06-23

## 2018-06-19 RX ORDER — DIPHENHYDRAMINE HYDROCHLORIDE 50 MG/ML
12.5 INJECTION, SOLUTION INTRAMUSCULAR; INTRAVENOUS
Status: DISCONTINUED | OUTPATIENT
Start: 2018-06-19 | End: 2018-06-24 | Stop reason: HOSPADM

## 2018-06-19 RX ORDER — HYDROMORPHONE HYDROCHLORIDE 1 MG/ML
1 INJECTION, SOLUTION INTRAMUSCULAR; INTRAVENOUS; SUBCUTANEOUS
Status: DISCONTINUED | OUTPATIENT
Start: 2018-06-19 | End: 2018-06-20

## 2018-06-19 RX ORDER — ACETAMINOPHEN 325 MG/1
650 TABLET ORAL
Status: DISCONTINUED | OUTPATIENT
Start: 2018-06-19 | End: 2018-06-24 | Stop reason: HOSPADM

## 2018-06-19 RX ORDER — BUPROPION HYDROCHLORIDE 150 MG/1
150 TABLET, EXTENDED RELEASE ORAL DAILY
COMMUNITY

## 2018-06-19 RX ORDER — GLYCOPYRROLATE 1 MG/1
1 TABLET ORAL 2 TIMES DAILY
COMMUNITY

## 2018-06-19 RX ADMIN — METHYLPREDNISOLONE SODIUM SUCCINATE 125 MG: 125 INJECTION, POWDER, FOR SOLUTION INTRAMUSCULAR; INTRAVENOUS at 20:24

## 2018-06-19 RX ADMIN — LEVETIRACETAM 750 MG: 100 SOLUTION ORAL at 21:34

## 2018-06-19 RX ADMIN — DEXTROSE MONOHYDRATE, SODIUM CHLORIDE, AND POTASSIUM CHLORIDE 100 ML/HR: 50; 4.5; 1.49 INJECTION, SOLUTION INTRAVENOUS at 21:34

## 2018-06-19 RX ADMIN — GLYCOPYRROLATE 1 MG: 1 TABLET ORAL at 22:28

## 2018-06-19 RX ADMIN — PIPERACILLIN SODIUM AND TAZOBACTAM SODIUM 3.38 G: 3; .375 INJECTION, POWDER, LYOPHILIZED, FOR SOLUTION INTRAVENOUS at 20:25

## 2018-06-19 RX ADMIN — HYDROMORPHONE HYDROCHLORIDE 1 MG: 2 INJECTION INTRAMUSCULAR; INTRAVENOUS; SUBCUTANEOUS at 20:24

## 2018-06-19 RX ADMIN — SODIUM CHLORIDE 125 ML/HR: 9 INJECTION, SOLUTION INTRAVENOUS at 20:24

## 2018-06-19 RX ADMIN — Medication 10 ML: at 21:35

## 2018-06-19 NOTE — IP AVS SNAPSHOT
[facility] 
 
 
 [address] [phone] Patient: Juani Higgins MRN: [id number] :1975 About your hospitalization You were admitted on:  2018 You last received care in the:  [facility] 5M1 MED SURG 1 You were discharged on:  2018 Why you were hospitalized Your primary diagnosis was:  Crohn's Regional Enteritis (Hcc) Your diagnoses also included:  Epilepsy (Hcc), Depression Follow-up Information Follow up With Details Comments Contact Info Jae Munroe MD Schedule an appointment as soon as possible for a visit in 1 week  [id number] [phone] Discharge Orders None A check chasidy indicates which time of day the medication should be taken. My Medications START taking these medications Instructions Each Dose to Equal  
 Morning Noon Evening Bedtime  
 levoFLOXacin 750 mg tablet Commonly known as:  Bosdanette Brojohn Your last dose was: Your next dose is: Take 1 Tab by mouth every twenty-four (24) hours for 5 days. 750 mg  
    
   
   
   
  
 metroNIDAZOLE 500 mg tablet Commonly known as:  FLAGYL Your last dose was: Your next dose is: Take 1 Tab by mouth every twelve (12) hours for 5 days. 500 mg  
    
   
   
   
  
 morphine IR 15 mg tablet Commonly known as:  MS IR Your last dose was: Your next dose is: Take 1 Tab by mouth every six (6) hours as needed. Max Daily Amount: 60 mg.  
 15 mg  
    
   
   
   
  
 predniSONE 20 mg tablet Commonly known as:  Lannis Rylan Your last dose was: Your next dose is: Take 2 tabs PO x 2 days then take 1.5 tabs PO x 2 days then take 1 tab PO x 2 days then take 0.5 tabs PO x 2 days CONTINUE taking these medications Instructions Each Dose to Equal  
 Morning Noon Evening Bedtime BENTYL 20 mg tablet Generic drug:  dicyclomine Your last dose was: Your next dose is: Take 20 mg by mouth four (4) times daily as needed. 20 mg  
    
   
   
   
  
 buPROPion  mg SR tablet Commonly known as:  Leigh Hidalgo Your last dose was: Your next dose is: Take 150 mg by mouth daily. 150 mg  
    
   
   
   
  
 cholecalciferol 5,000 unit capsule Commonly known as:  VITAMIN D3 Your last dose was: Your next dose is: Take 10,000 Units by mouth daily. 46845 Units  
    
   
   
   
  
 glycopyrrolate 1 mg tablet Commonly known as:  Yared Rdz Your last dose was: Your next dose is: Take 1 mg by mouth two (2) times a day. Indications: Diarrhea  
 1 mg HUMIRA 40 mg/0.8 mL injection Generic drug:  adalimumab Your last dose was: Your next dose is:    
   
   
 40 mg by SubCUTAneous route every fourteen (14) days. Every other Monday 40 mg  
    
   
   
   
  
 levETIRAcetam 100 mg/mL solution Commonly known as:  KEPPRA Your last dose was: Your next dose is: Take 750 mg by mouth two (2) times a day. 750 mg  
    
   
   
   
  
 MYRBETRIQ 50 mg ER tablet Generic drug:  mirabegron ER Your last dose was: Your next dose is: Take 50 mg by mouth daily. 50 mg Where to Get Your Medications Information on where to get these meds will be given to you by the nurse or doctor. ! Ask your nurse or doctor about these medications  
  levoFLOXacin 750 mg tablet  
 metroNIDAZOLE 500 mg tablet  
 morphine IR 15 mg tablet  
 predniSONE 20 mg tablet Opioid Education  Prescription Opioids: What You Need to Know: 
 
Prescription opioids can be used to help relieve moderate-to-severe pain and are often prescribed following a surgery or injury, or for certain health conditions. These medications can be an important part of treatment but also come with serious risks. Opioids are strong pain medicines. Examples include hydrocodone, oxycodone, fentanyl, and morphine. Heroin is an example of an illegal opioid. It is important to work with your health care provider to make sure you are getting the safest, most effective care. WHAT ARE THE RISKS AND SIDE EFFECTS OF OPIOID USE? Prescription opioids carry serious risks of addiction and overdose, especially with prolonged use. An opioid overdose, often marked by slow breathing, can cause sudden death. The use of prescription opioids can have a number of side effects as well, even when taken as directed. · Tolerance-meaning you might need to take more of a medication for the same pain relief · Physical dependence-meaning you have symptoms of withdrawal when the medication is stopped. Withdrawal symptoms can include nausea, sweating, chills, diarrhea, stomach cramps, and muscle aches. Withdrawal can last up to several weeks, depending on which drug you took and how long you took it. · Increased sensitivity to pain · Constipation · Nausea, vomiting, and dry mouth · Sleepiness and dizziness · Confusion · Depression · Low levels of testosterone that can result in lower sex drive, energy, and strength · Itching and sweating RISKS ARE GREATER WITH:      
· History of drug misuse, substance use disorder, or overdose · Mental health conditions (such as depression or anxiety) · Sleep apnea · Older age (72 years or older) · Pregnancy Avoid alcohol while taking prescription opioids. Also, unless specifically advised by your health care provider, medications to avoid include: · Benzodiazepines (such as Xanax or Valium) · Muscle relaxants (such as Soma or Flexeril) · Hypnotics (such as Ambien or Lunesta) · Other prescription opioids KNOW YOUR OPTIONS Talk to your health care provider about ways to manage your pain that don't involve prescription opioids. Some of these options may actually work better and have fewer risks and side effects. Options may include: 
· Pain relievers such as acetaminophen, ibuprofen, and naproxen · Some medications that are also used for depression or seizures · Physical therapy and exercise · Counseling to help patients learn how to cope better with triggers of pain and stress. · Application of heat or cold compress · Massage therapy · Relaxation techniques Be Informed Make sure you know the name of your medication, how much and how often to take it, and its potential risks & side effects. IF YOU ARE PRESCRIBED OPIOIDS FOR PAIN: 
· Never take opioids in greater amounts or more often than prescribed. Remember the goal is not to be pain-free but to manage your pain at a tolerable level. · Follow up with your primary care provider to: · Work together to create a plan on how to manage your pain. · Talk about ways to help manage your pain that don't involve prescription opioids. · Talk about any and all concerns and side effects. · Help prevent misuse and abuse. · Never sell or share prescription opioids · Help prevent misuse and abuse. · Store prescription opioids in a secure place and out of reach of others (this may include visitors, children, friends, and family). · Safely dispose of unused/unwanted prescription opioids: Find your community drug take-back program or your pharmacy mail-back program, or flush them down the toilet, following guidance from the Food and Drug Administration (www.fda.gov/Drugs/ResourcesForYou). · Visit www.cdc.gov/drugoverdose to learn about the risks of opioid abuse and overdose. · If you believe you may be struggling with addiction, tell your health care provider and ask for guidance or call [employer] at 7-324-327-SNMN. Discharge Instructions Patient Discharge Instructions Mickey Hdz / [id number] : 1975 Admitted 2018 Discharged: 2018 Primary Diagnoses Problem List as of 2018  Date Reviewed: 2018 Codes Class Noted - Resolved * (Principal)Crohn's regional enteritis [facility]) ICD-10-CM: K50.90 ICD-9-CM: 555.9  2018 - Present Epilepsy ([facility] (Chronic) ICD-10-CM: G40.909 ICD-9-CM: 345.90  2018 - Present Depression (Chronic) ICD-10-CM: F32.9 ICD-9-CM: 129  2018 - Present Ulcerative colitis ([facility] ICD-10-CM: K51.90 ICD-9-CM: 556.9  2010 - Present Pancreatitis ICD-10-CM: K85.90 ICD-9-CM: 152.4  2010 - Present Take Home Medications · It is important that you take the medication exactly as they are prescribed. · Keep your medication in the bottles provided by the pharmacist and keep a list of the medication names, dosages, and times to be taken in your wallet. · Do not take other medications without consulting your doctor. What to do at [facility] Recommended diet: Resume previous diet after 2 days of bland/soft diet post-discharge Recommended activity: Activity as tolerated If you experience worsening symptoms, please follow up with nearest ER. Follow-up with your PCP in 1 week Information obtained by : 
I understand that if any problems occur once I am at home I am to contact my physician. I understand and acknowledge receipt of the instructions indicated above. Physician's or R.N.'s Signature                                                                  Date/Time Patient or Representative Signature                                                          Date/Time Crohn's Disease: Care Instructions Your Care Instructions Crohn's disease is a lifelong inflammatory bowel disease (IBD). Parts of the digestive tract get swollen and irritated and may develop deep sores called ulcers. Crohn's disease usually occurs in the last part of the small intestine and the first part of the large intestine. But it can develop anywhere from the mouth to the anus. The main symptoms of Crohn's disease are belly pain, diarrhea, fever, and weight loss. Some people may have constipation. Crohn's disease also sometimes causes problems with the joints, eyes, or skin. Your symptoms may be mild at some times and severe at others. The disease can also go into remission, which means that it is not active and you have no symptoms. Bad attacks of Crohn's disease often have to be treated in the hospital so that you can get medicines and liquids through a tube in your vein, called an IV. This gives your digestive system time to rest and recover. Talk with your doctor about the best treatments for you. You may need medicines that help prevent or treat flare-ups of the disease. You may need surgery to remove part of your bowel if you have an abnormal opening in the bowel (fistula), an abscess, or a bowel obstruction. In some cases, surgery is needed if medicines do not work. But symptoms often return to other areas of the intestines after surgery. Learning good self-care can help you reduce your symptoms and manage Crohn's disease. Follow-up care is a key part of your treatment and safety. Be sure to make and go to all appointments, and call your doctor if you are having problems. It's also a good idea to know your test results and keep a list of the medicines you take. How can you care for yourself at home? · Take your medicines exactly as prescribed. Call your doctor if you think you are having a problem with your medicine. You will get more details on the specific medicines your doctor prescribes. · Do not take anti-inflammatory medicines, such as aspirin, ibuprofen (Advil, Motrin), or naproxen (Aleve). They may make your symptoms worse. Do not take any other medicines or herbal products without talking to your doctor first. 
· Avoid foods that make your symptoms worse. These might include milk, alcohol, high-fiber foods, or spicy foods. · Eat a healthy diet. Make sure to get enough iron. Rectal bleeding may make you lose iron. Good sources of iron include beef, lentils, spinach, raisins, and iron-enriched breads and cereals. · Drink liquid meal replacements if your doctor recommends them. These are high in calories and contain vitamins and minerals. Severe symptoms may make it hard for your body to absorb vitamins and minerals. · Do not smoke. Smoking makes Crohn's disease worse. If you need help quitting, talk to your doctor about stop-smoking programs and medicines. These can increase your chances of quitting for good. · Seek support from friends and family to help cope with Crohn's disease. The illness can affect all parts of your life. Get counseling if you need it. When should you call for help? Call 911 anytime you think you may need emergency care. For example, call if: 
? · Your stools are maroon or very bloody. ? · You passed out (lost consciousness). ?Call your doctor now or seek immediate medical care if: 
? · You are vomiting. ? · You have new or worse belly pain. ? · You have a fever. ? · You cannot pass stools or gas. ? · You have new or more blood in your stools. ? Watch closely for changes in your health, and be sure to contact your doctor if: 
? · You have new or worse symptoms. ? · You are losing weight. ? · You do not get better as expected. Where can you learn more? Go to http://shayan-jarek.info/. Enter 21  in the search box to learn more about \"Crohn's Disease: Care Instructions. \" Current as of: May 12, 2017 Content Version: 11.4 © 6489-9558 Healthwise, Incorporated. Care instructions adapted under license by [employer] (which disclaims liability or warranty for this information). If you have questions about a medical condition or this instruction, always ask your healthcare professional. [address] any warranty or liability for your use of this information. Introducing [facility]! Iona Palm introduces marshallindex patient portal. Now you can access parts of your medical record, email your doctor's office, and request medication refills online. 1. In your internet browser, go to [email or web address] 2. Click on the First Time User? Click Here link in the Sign In box. You will see the New Member Sign Up page. 3. Enter your marshallindex Access Code exactly as it appears below. You will not need to use this code after youve completed the sign-up process. If you do not sign up before the expiration date, you must request a new code. · marshallindex Access Code: [email or web address] Expires: 9/6/2018  8:20 AM 
 
4. Enter the last four digits of your Social Security Number (xxxx) and Date of Birth (mm/dd/yyyy) as indicated and click Submit. You will be taken to the next sign-up page. 5. Create a [employer] ID. This will be your marshallindex login ID and cannot be changed, so think of one that is secure and easy to remember. 6. Create a marshallindex password. You can change your password at any time. 7. Enter your Password Reset Question and Answer. This can be used at a later time if you forget your password. 8. Enter your e-mail address. You will receive e-mail notification when new information is available in [address] 9. Click Sign Up.  You can now view and download portions of your medical record. 10. Click the Download Summary menu link to download a portable copy of your medical information. If you have questions, please visit the Frequently Asked Questions section of the [employer] website. Remember, [employer] is NOT to be used for urgent needs. For medical emergencies, dial 911. Now available from your iPhone and Android! Introducing Jordon Nielsen As a [facility] patient, I wanted to make you aware of our electronic visit tool called Jordon Nielsen. [employer] 24/7 allows you to connect within minutes with a medical provider 24 hours a day, seven days a week via a mobile device or tablet or logging into a secure website from your computer. You can access Jordon Nielsen from anywhere in the [place]. A virtual visit might be right for you when you have a simple condition and feel like you just dont want to get out of bed, or cant get away from work for an appointment, when your regular [facility] provider is not available (evenings, weekends or holidays), or when youre out of town and need minor care. Electronic visits cost only $49 and if the [employer] 24/7 provider determines a prescription is needed to treat your condition, one can be electronically transmitted to a nearby pharmacy*. Please take a moment to enroll today if you have not already done so. The enrollment process is free and takes just a few minutes. To enroll, please download the [employer] jd to your tablet or phone, or visit [email or web address] to enroll on your computer. And, as an [address] [place] [address] patient with a [employer] account, the results of your visits will be scanned into your electronic medical record and your primary care provider will be able to view the scanned results. We urge you to continue to see your regular [facility] provider for your ongoing medical care.   And while your primary care provider may not be the one available when you seek a Jordon Nielsen virtual visit, the peace of mind you get from getting a real diagnosis real time can be priceless. For more information on Jordon Traceysangeetafin, view our Frequently Asked Questions (FAQs) at [email or web address]. Sincerely, 
 
Arsalan Leavitt MD 
Chief Medical Officer Rafaela Verma *:  certain medications cannot be prescribed via Jordon Nielsen Unresulted tests-please follow up with your PCP on these results Procedure/Test Authorizing Provider C. DIFFICILE (DNA) Zac Burch, NP  
 CALPROTECTIN, FECAL Zac Burch, NP  
 CBC W/O DIFF Vinay Ramirez MD  
 CBC W/O DIFF Scout Chapman MD  
 CBC WITH AUTOMATED DIFF Kathy Damico, DO  
 CBC WITH AUTOMATED DIFF Margurette Rich Sundra Fiscal, DO  
 CBC WITH AUTOMATED DIFF Margurette Rich Sundra Fiscal, DO  
 CULTURE, STOOL Zac Burch, NP  
 HEPATIC FUNCTION PANEL Scout Chapman MD  
 MAGNESIUM Vinay Ramirez MD  
 [address] [place] [address], MD Moncada, DO  
 MAGNESIUM Louann Rojas, DO  
 METABOLIC PANEL, BASIC Vinay Ramirez MD  
 METABOLIC PANEL, BASIC Kathy Damico, DO  
 METABOLIC PANEL, BASIC Scout Chapman MD  
 METABOLIC PANEL, BASIC Lew Bob, DO  
 METABOLIC PANEL, BASIC Louann Rojas, DO  
 PHOSPHORUS Scout Chapman MD  
 [address], DO  
 SAMPLES BEING HELD Scout Chapman MD  
 URINALYSIS W/ REFLEX CULTURE Zac uBrch NP Providers Seen During Your Hospitalization Provider Specialty Primary office phone Kathy Damico DO Emergency Medicine [phone] Vinay Ramirez MD Internal Medicine [phone] Louann Rojas DO Internal Medicine [phone] Your Primary Care Physician (PCP) Primary Care Physician Office Phone Office Fax Yoko Abu [phone] You are allergic to the following Allergen Reactions Vancomycin Hives Recent Documentation Height Weight BMI OB Status Smoking Status 1.6 m 61.2 kg 23.91 kg/m2 Having regular periods Never Smoker Emergency Contacts Name Discharge Info Relation Home Work Mobile [place] [employer] DISCHARGE CAREGIVER [3] Mother [14] [phone] Patient Belongings The following personal items are in your possession at time of discharge: 
  Dental Appliances: None  Visual Aid: Glasses      Home Medications: Sent home   Jewelry: None  Clothing: Undergarments, With patient, Shirt, Pants    Other Valuables: Cell Phone, Eyeglasses Please provide this summary of care documentation to your next provider. Signatures-by signing, you are acknowledging that this After Visit Summary has been reviewed with you and you have received a copy. Patient Signature:  ____________________________________________________________ Date:  ____________________________________________________________  
  
Erik Tejeda Provider Signature:  ____________________________________________________________ Date:  ____________________________________________________________

## 2018-06-19 NOTE — IP AVS SNAPSHOT
[address] [place] [address] 1007 [facility] 
[phone] Patient: Jamarcus Callahan MRN: [id number] :1975 A check chasidy indicates which time of day the medication should be taken. My Medications START taking these medications Instructions Each Dose to Equal  
 Morning Noon Evening Bedtime  
 levoFLOXacin 750 mg tablet Commonly known as:  Romayne Potters Your last dose was: Your next dose is: Take 1 Tab by mouth every twenty-four (24) hours for 5 days. 750 mg  
    
   
   
   
  
 metroNIDAZOLE 500 mg tablet Commonly known as:  FLAGYL Your last dose was: Your next dose is: Take 1 Tab by mouth every twelve (12) hours for 5 days. 500 mg  
    
   
   
   
  
 morphine IR 15 mg tablet Commonly known as:  MS IR Your last dose was: Your next dose is: Take 1 Tab by mouth every six (6) hours as needed. Max Daily Amount: 60 mg.  
 15 mg  
    
   
   
   
  
 predniSONE 20 mg tablet Commonly known as:  Ranjit Wagner Your last dose was: Your next dose is: Take 2 tabs PO x 2 days then take 1.5 tabs PO x 2 days then take 1 tab PO x 2 days then take 0.5 tabs PO x 2 days CONTINUE taking these medications Instructions Each Dose to Equal  
 Morning Noon Evening Bedtime BENTYL 20 mg tablet Generic drug:  dicyclomine Your last dose was: Your next dose is: Take 20 mg by mouth four (4) times daily as needed. 20 mg  
    
   
   
   
  
 buPROPion  mg SR tablet Commonly known as:  Suni Hills Your last dose was: Your next dose is: Take 150 mg by mouth daily. 150 mg  
    
   
   
   
  
 cholecalciferol 5,000 unit capsule Commonly known as:  VITAMIN D3 Your last dose was: Your next dose is: Take 10,000 Units by mouth daily. 53345 Units  
    
   
   
   
  
 glycopyrrolate 1 mg tablet Commonly known as:  Kylie Harman Your last dose was: Your next dose is: Take 1 mg by mouth two (2) times a day. Indications: Diarrhea  
 1 mg HUMIRA 40 mg/0.8 mL injection Generic drug:  adalimumab Your last dose was: Your next dose is:    
   
   
 40 mg by SubCUTAneous route every fourteen (14) days. Every other Monday 40 mg  
    
   
   
   
  
 levETIRAcetam 100 mg/mL solution Commonly known as:  KEPPRA Your last dose was: Your next dose is: Take 750 mg by mouth two (2) times a day. 750 mg  
    
   
   
   
  
 MYRBETRIQ 50 mg ER tablet Generic drug:  mirabegron ER Your last dose was: Your next dose is: Take 50 mg by mouth daily. 50 mg Where to Get Your Medications Information on where to get these meds will be given to you by the nurse or doctor. ! Ask your nurse or doctor about these medications  
  levoFLOXacin 750 mg tablet  
 metroNIDAZOLE 500 mg tablet  
 morphine IR 15 mg tablet  
 predniSONE 20 mg tablet

## 2018-06-19 NOTE — PROGRESS NOTES
[facility]: MED RECONCILIATION      Medications added:   · Myrebtriq 50mg daily  · Bentyl  · Glycopyrrolate  · Vit D3- pt reported 21880 units    Medications removed:  · Duloxetine 60mg BID  · Lamictal 100mg BID  · Seroquel 300mg BID  · Ciprofloxacin PO daily  · Adderall 10mg BID    Medications adjusted:  · Keppra- previously 10mg/kg BID  · Bupropion- previously 100mg (IR) BID- confirmed that pt stated taking once daily no    Information obtained from: Patient, medication bottles, RxQuery      Allergies: Vancomycin    Prior to Admission Medications:     Prior to Admission Medications   Prescriptions Last Dose Informant Patient Reported? Taking? adalimumab (HUMIRA) 40 mg/0.8 mL injection 2018  Yes Yes   Si mg by SubCUTAneous route every fourteen (14) days. Every other Monday   buPROPion SR (WELLBUTRIN SR) 150 mg SR tablet 2018 at AM  Yes Yes   Sig: Take 150 mg by mouth daily. cholecalciferol (VITAMIN D3) 5,000 unit capsule 2018 at AM  Yes Yes   Sig: Take 10,000 Units by mouth daily. dicyclomine (BENTYL) 20 mg tablet   Yes Yes   Sig: Take 20 mg by mouth four (4) times daily as needed. glycopyrrolate (ROBINUL) 1 mg tablet 2018 at AM  Yes Yes   Sig: Take 1 mg by mouth two (2) times a day. Indications: Diarrhea   levetiracetam (KEPPRA) 100 mg/mL solution 2018 at AM  Yes Yes   Sig: Take 750 mg by mouth two (2) times a day. mirabegron ER (MYRBETRIQ) 50 mg ER tablet 2018 at AM  Yes Yes   Sig: Take 50 mg by mouth daily. Facility-Administered Medications: None       Thank you,  Cornelius Tomlin, Pharm. D.

## 2018-06-19 NOTE — ED PROVIDER NOTES
HPI Comments: 38 y.o. female with past medical history significant for Crohn's disease, ulcerative colitis, pancreatitis, epilepsy, depression, and bipolar 2 disorder who presents to the ED with chief complaint of severe abdominal pain. Pt reports middle lower abdominal pain onset about a month ago that worsened about a week ago and is accompanied by decreased appetite and diarrhea. Pt states she has hx of Crohn's colitis (dx at age 13) and is S/P colectomy, says she has a J pouch and has 10-12 episodes of diarrhea per day at baseline. Pt states her last BM was about 30 minutes ago. Pt states she had an XR small bowel series today that showed narrowing of the distal ileum. Pt states she was seen by Dr. Ahmet Wolff for her sx and was referred to the ED to be admitted for antibiotics and steroids and a scope of her J pouch tomorrow. Pt states she last ate a piece of toast at about noon today. Pt states she takes Robinul. Pt states she was recently on Humira about 2 weeks ago. Pt denies any recent steroids. Pt denies fever, vomiting, hematochezia, or urinary sx. There are no other acute medical complaints voiced at this time. Small bowel FT earlier today showed:    Luminal narrowing of the distal 4 cm of the ileum is new since March. Differential diagnosis is chronic Crohn enteritis more likely than acute Crohn  enteritis. I suspect that the cramping abdominal pain is due to small intestinal  peristalsis as it attempts to push contents through the narrowed lumen of the  distal ileum. Consider direct visualization.     Chart review: Pt had a CT scan of her abdomen on 6/14/18 that showed no acute abnormalities. Social Hx: Never smoker. Denies EtOH use. PCP: Zachary Montesinos MD  Gastroenterology: Dr. Ahmet Wolff    Note written by Ray Padron, as dictated by Arcadio Mccormick DO 6:12 PM     The history is provided by the patient, medical records and a relative (sister).         Past Medical History:   Diagnosis Date    [facility]     Colitis, ulcerative chronic (HCC)     Crohn disease ([facility]     Depression     Epilepsy ([facility]     Pancreatitis 9/13/2010    Seizure ([facility]     Ulcerative colitis ([facility] 9/13/2010       Past Surgical History:   Procedure Laterality Date    FLEXIBLE SIGMOIDOSCOPY N/A 10/26/2016    FLEXIBLE SIGMOIDOSCOPY  performed by Naa Tee MD at [facility] HX APPENDECTOMY       [facility]    with J pouch    HX TONSIL AND ADENOIDECTOMY      age 6         No family history on file. Social History     Social History    Marital status: SINGLE     Spouse name: N/A    Number of children: N/A    Years of education: N/A     Occupational History    Not on file. Social History Main Topics    Smoking status: Never Smoker    Smokeless tobacco: Not on file    Alcohol use No    Drug use: No    Sexual activity: Not on file     Other Topics Concern    Not on file     Social History Narrative         ALLERGIES: Vancomycin    Review of Systems   Constitutional: Positive for appetite change (decreased). Negative for fever. Respiratory: Negative for cough and shortness of breath. Cardiovascular: Negative for chest pain and leg swelling. Gastrointestinal: Positive for abdominal pain (lower) and diarrhea. Negative for blood in stool and vomiting. Genitourinary: Negative for dysuria and hematuria. All other systems reviewed and are negative. Vitals:    06/19/18 1803   BP: 140/65   Pulse: 91   Resp: 18   Temp: 99 °F (37.2 °C)   SpO2: 98%   Weight: 61.2 kg (135 lb)   Height: 5' 3\" (1.6 m)            Physical Exam   Nursing note and vitals reviewed. Constitutional: Pt is awake and alert. Pt appears well-developed and well-nourished. NAD. HENT:   Head: Normocephalic and atraumatic. Nose: Nose normal.   Mouth/Throat: Oropharynx is clear and moist. No oropharyngeal exudate.    Eyes: Conjunctivae and extraocular motions are normal. Pupils are equal, round, and reactive to light. Right eye exhibits no discharge. Left eye exhibits no discharge. No scleral icterus. Neck: No tracheal deviation present. Supple neck. Cardiovascular: Normal rate, regular rhythm, normal heart sounds and intact distal pulses. Exam reveals no gallop and no friction rub. No murmur heard. Pulmonary/Chest: Effort normal and breath sounds normal.  Pt  has no wheezes. Pt  has no rales. Abdominal: Soft. Pt  exhibits no distension and no mass. Generalized lower abdominal tenderness. Pt  has no rebound and no guarding. Musculoskeletal:  Pt  exhibits no edema and no tenderness. Ext: Normal ROM in all four extremities; not tender to palpation; distal pulses are normal, no edema. Neurological:  Pt is alert. nonfocal neuro exam.  Skin: Skin is warm and dry. Pt  is not diaphoretic. Psychiatric:  Pt  has a normal mood and affect. Behavior is normal.     Note written by Ray Munoz, as dictated by Lidia Gomez DO 6:13 PM      [facility]      ED Course       Procedures    CONSULT NOTE:  6:00 PM Lidia Gomez DO spoke with Dr. Jenny Powell for Gastroenterology prior to pt's arrival in the ED. Discussed available diagnostic tests and clinical findings. Plan was to admit to the hospitalist for antibiotics and steroids and he wants to scope her J pouch tomorrow. Labs Reviewed   CBC WITH AUTOMATED DIFF - Abnormal; Notable for the following:        Result Value    WBC 11.6 (*)     RDW 11.3 (*)     All other components within normal limits   METABOLIC PANEL, BASIC - Abnormal; Notable for the following:     Potassium 3.4 (*)     BUN/Creatinine ratio 10 (*)     All other components within normal limits   SAMPLES BEING HELD       Hospitalist contacted. Admit. Labs, ivf, pain meds, steroids, abx.

## 2018-06-19 NOTE — PROGRESS NOTES
[facility] Dosing Services: Antimicrobial Stewardship Daily Doc    Consult for antibiotic dosing of Zosyn by Dr. nagy  Indication: Intra-abdominal (crohns enteritis)  Day of Therapy 1    Non-Kinetic Antimicrobial Dosing Regimen:   Current Regimen:  Zosyn 3.375 gm q 8hrs (ext infusion)      Other Antimicrobial   (not dosed by pharmacist) None   Cultures    Serum Creatinine Lab Results   Component Value Date/Time    Creatinine 0.80 06/19/2018 06:10 PM    Creatinine (POC) 0.6 01/02/2011 08:58 PM         Creatinine Clearance Estimated Creatinine Clearance: 74.2 mL/min (based on Cr of 0.8). Temp Temp: 99 °F (37.2 °C)       WBC Lab Results   Component Value Date/Time    WBC 11.6 (H) 06/19/2018 06:10 PM        H/H Lab Results   Component Value Date/Time    HGB 13.3 06/19/2018 06:10 PM        Platelets    Lab Results   Component Value Date/Time    PLATELET 762 08/85/5644 06:10 PM            Thank you,  John Griggs, Pharm. D.

## 2018-06-19 NOTE — H&P
[facility]  [address] [facility], [place], [address] [place] [address]  [phone]    Admission History and Physical      NAME:  Talia Vega   :   1975   MRN:  [id number]     PCP:  Antoni Wilkinson MD     Date/Time:  2018         Subjective:     CHIEF COMPLAINT: abd pain     HISTORY OF PRESENT ILLNESS:     The patient is a 39 yo hx of Crohn's s/p colectomy w/ internal J-pouch, presented w/ lower abd pain, Crohn's enteritis. The patient take Humira for Crohn's, but has had worsening lower abd pain for 1 month, associated with nausea, poor PO intake. She denied fevers, chills, vomiting, diarrhea, or GI bleed. Recent Xray showed a narrowing of the distal ileum, concerning for enteritis. Her GI doctor, Dr. Josefina Shah, wanted to admit the patient for IV abx, steroids, and colonoscopy. Allergies   Allergen Reactions    Vancomycin Hives       Prior to Admission medications    Medication Sig Start Date End Date Taking? Authorizing Provider   buPROPion SR [facility] SR) 150 mg SR tablet Take 150 mg by mouth daily. Yes Historical Provider   mirabegron ER (MYRBETRIQ) 50 mg ER tablet Take 50 mg by mouth daily. Yes Historical Provider   glycopyrrolate (ROBINUL) 1 mg tablet Take 1 mg by mouth two (2) times a day. Indications: Diarrhea   Yes Historical Provider   dicyclomine (BENTYL) 20 mg tablet Take 20 mg by mouth four (4) times daily as needed. Yes Historical Provider   cholecalciferol (VITAMIN D3) 5,000 unit capsule Take 10,000 Units by mouth daily. Yes Historical Provider   levetiracetam (KEPPRA) 100 mg/mL solution Take 750 mg by mouth two (2) times a day. Yes Kenna Suarez MD   adalimumab (HUMIRA) 40 mg/0.8 mL injection 40 mg by SubCUTAneous route every fourteen (14) days.  Every other Monday   Yes Kenna Suarez MD       Past Medical History:   Diagnosis Date    Bipolar 2 disorder ([address]     Colitis, ulcerative chronic ([address]     Crohn disease ([address]     Depression     Epilepsy ([facility]     Pancreatitis 9/13/2010    Seizure ([facility]     [facility] 9/13/2010        Past Surgical History:   Procedure Laterality Date    FLEXIBLE SIGMOIDOSCOPY N/A 10/26/2016    FLEXIBLE SIGMOIDOSCOPY  performed by Oscar Stokes MD at [address] [facility] HX APPENDECTOMY       [facility]    with J pouch    HX TONSIL AND ADENOIDECTOMY      age 6       Social History   Substance Use Topics    Smoking status: Never Smoker    Smokeless tobacco: Not on file    Alcohol use No        Family History   Problem Relation Age of Onset    Family history unknown: Yes        Review of Systems:  (bold if positive, if negative)    Gen:  Eyes:  ENT:  CVS:  Pulm:  GI:  Abdominal pain, nausea  :    MS:  Skin:  Psych:  Endo:    Hem:  Renal:    Neuro:          Objective:      VITALS:    Vital signs reviewed; most recent are:    Visit Vitals    /65 (BP 1 Location: Right arm, BP Patient Position: Sitting)    Pulse 91    Temp 99 °F (37.2 °C)    Resp 18    Ht 5' 3\" (1.6 m)    Wt 61.2 kg (135 lb)    SpO2 98%    BMI 23.91 kg/m2     SpO2 Readings from Last 6 Encounters:   06/19/18 98%   03/23/18 100%   01/25/18 96%   12/01/16 100%   11/06/16 99%   10/26/16 100%        No intake or output data in the 24 hours ending 06/19/18 1922     Exam:     Physical Exam:    Gen:  Well-developed, well-nourished, in no acute distress  HEENT:  Pink conjunctivae, PERRL, hearing intact to voice, moist mucous membranes  Neck:  Supple, without masses, thyroid non-tender  Resp:  No accessory muscle use, clear breath sounds without wheezes rales or rhonchi  Card:  No murmurs, normal S1, S2 without thrills, bruits or peripheral edema  Abd:  Soft, mild lower abd pain, non-distended, normoactive bowel sounds are present, no palpable organomegaly and no detectable hernias  Lymph:  No cervical adenopathy  Musc:  No cyanosis or clubbing  Skin:  No rashes   Neuro:  Cranial nerves 3-12 are grossly intact, follows commands appropriately  Psych:  Alert with good insight. Oriented to person, place, and time    Labs:    Recent Labs      06/19/18   1810   WBC  11.6*   HGB  13.3   HCT  38.4   PLT  266     Recent Labs      06/19/18   1810   NA  138   K  3.4*   CL  105   CO2  26   GLU  89   BUN  8   CREA  0.80   CA  8.9     Lab Results   Component Value Date/Time    Glucose (POC) 248 (H) 10/26/2016 04:03 PM    Glucose (POC) 79 10/26/2016 03:13 PM     No results for input(s): PH, PCO2, PO2, HCO3, FIO2 in the last 72 hours. No results for input(s): INR in the last 72 hours. No lab exists for component: INREXT    Radiology and EKG reviewed:   abd xray reviewed    **Old [facility]**       Assessment/Plan:       Principal Problem:    37 yo hx of Crohn's s/p colectomy w/ internal J-pouch, presented w/ lower abd pain, Crohn's enteritis    1) Acute abd pain/Crohn's regional enteritis: abd xray w/ distal ileum narrowing. No evidence of sepsis. Will keep NPO, start on IVF, IV Zosyn, IV solumedrol. Consult GI for colonoscopy in AM    2) Mild malnutrition/poor PO intake: due to above.   Consult Nutrition    3) Epilepsy: cont Keppra    4) Depression: cont Wellbutrin    Code: Full     Risk of deterioration: high      Total time spent with patient: 60 min                  Care Plan discussed with: Patient, nursing    Discussed:  Care Plan    Prophylaxis:  SCD's    Probable Disposition:  Home w/Family           ___________________________________________________    Attending Physician: Cecelia Smith MD

## 2018-06-19 NOTE — ED TRIAGE NOTES
Patient co mid lower abdominal pain and diarrhea x 1 month reports hx of chrons. States she went to see her PCP Candice Elliott and was told to come to ER to be admitted.

## 2018-06-20 LAB
ALBUMIN SERPL-MCNC: 3.4 G/DL (ref 3.5–5)
ALBUMIN/GLOB SERPL: 1.1 {RATIO} (ref 1.1–2.2)
ALP SERPL-CCNC: 60 U/L (ref 45–117)
ALT SERPL-CCNC: 28 U/L (ref 12–78)
ANION GAP SERPL CALC-SCNC: 7 MMOL/L (ref 5–15)
AST SERPL-CCNC: 18 U/L (ref 15–37)
BILIRUB DIRECT SERPL-MCNC: 0.1 MG/DL (ref 0–0.2)
BILIRUB SERPL-MCNC: 0.3 MG/DL (ref 0.2–1)
BUN SERPL-MCNC: 7 MG/DL (ref 6–20)
BUN/CREAT SERPL: 7 (ref 12–20)
CALCIUM SERPL-MCNC: 8.6 MG/DL (ref 8.5–10.1)
CHLORIDE SERPL-SCNC: 103 MMOL/L (ref 97–108)
CO2 SERPL-SCNC: 24 MMOL/L (ref 21–32)
CREAT SERPL-MCNC: 0.96 MG/DL (ref 0.55–1.02)
ERYTHROCYTE [DISTWIDTH] IN BLOOD BY AUTOMATED COUNT: 11.2 % (ref 11.5–14.5)
GLOBULIN SER CALC-MCNC: 3.2 G/DL (ref 2–4)
GLUCOSE SERPL-MCNC: 158 MG/DL (ref 65–100)
HCT VFR BLD AUTO: 38.4 % (ref 35–47)
HGB BLD-MCNC: 13.4 G/DL (ref 11.5–16)
MAGNESIUM SERPL-MCNC: 1.8 MG/DL (ref 1.6–2.4)
MCH RBC QN AUTO: 32.1 PG (ref 26–34)
MCHC RBC AUTO-ENTMCNC: 34.9 G/DL (ref 30–36.5)
MCV RBC AUTO: 92.1 FL (ref 80–99)
NRBC # BLD: 0 K/UL (ref 0–0.01)
NRBC BLD-RTO: 0 PER 100 WBC
PHOSPHATE SERPL-MCNC: 3.2 MG/DL (ref 2.6–4.7)
PLATELET # BLD AUTO: 248 K/UL (ref 150–400)
PMV BLD AUTO: 8.9 FL (ref 8.9–12.9)
POTASSIUM SERPL-SCNC: 4.1 MMOL/L (ref 3.5–5.1)
PROT SERPL-MCNC: 6.6 G/DL (ref 6.4–8.2)
RBC # BLD AUTO: 4.17 M/UL (ref 3.8–5.2)
SODIUM SERPL-SCNC: 134 MMOL/L (ref 136–145)
WBC # BLD AUTO: 8.2 K/UL (ref 3.6–11)

## 2018-06-20 PROCEDURE — 85027 COMPLETE CBC AUTOMATED: CPT | Performed by: INTERNAL MEDICINE

## 2018-06-20 PROCEDURE — 84100 ASSAY OF PHOSPHORUS: CPT | Performed by: INTERNAL MEDICINE

## 2018-06-20 PROCEDURE — 80076 HEPATIC FUNCTION PANEL: CPT | Performed by: INTERNAL MEDICINE

## 2018-06-20 PROCEDURE — 74011250637 HC RX REV CODE- 250/637: Performed by: INTERNAL MEDICINE

## 2018-06-20 PROCEDURE — 80048 BASIC METABOLIC PNL TOTAL CA: CPT | Performed by: INTERNAL MEDICINE

## 2018-06-20 PROCEDURE — 74011250636 HC RX REV CODE- 250/636: Performed by: NURSE PRACTITIONER

## 2018-06-20 PROCEDURE — 87493 C DIFF AMPLIFIED PROBE: CPT | Performed by: NURSE PRACTITIONER

## 2018-06-20 PROCEDURE — 74011250636 HC RX REV CODE- 250/636: Performed by: INTERNAL MEDICINE

## 2018-06-20 PROCEDURE — 36415 COLL VENOUS BLD VENIPUNCTURE: CPT | Performed by: INTERNAL MEDICINE

## 2018-06-20 PROCEDURE — 65270000029 HC RM PRIVATE

## 2018-06-20 PROCEDURE — 83735 ASSAY OF MAGNESIUM: CPT | Performed by: INTERNAL MEDICINE

## 2018-06-20 PROCEDURE — 74011000258 HC RX REV CODE- 258: Performed by: INTERNAL MEDICINE

## 2018-06-20 PROCEDURE — 83993 ASSAY FOR CALPROTECTIN FECAL: CPT | Performed by: NURSE PRACTITIONER

## 2018-06-20 PROCEDURE — 87427 SHIGA-LIKE TOXIN AG IA: CPT | Performed by: NURSE PRACTITIONER

## 2018-06-20 RX ORDER — MAGNESIUM CITRATE
296 SOLUTION, ORAL ORAL
Status: COMPLETED | OUTPATIENT
Start: 2018-06-21 | End: 2018-06-21

## 2018-06-20 RX ORDER — MORPHINE SULFATE 15 MG/1
15 TABLET ORAL
Status: DISCONTINUED | OUTPATIENT
Start: 2018-06-20 | End: 2018-06-24 | Stop reason: HOSPADM

## 2018-06-20 RX ORDER — HYDROMORPHONE HYDROCHLORIDE 1 MG/ML
1 INJECTION, SOLUTION INTRAMUSCULAR; INTRAVENOUS; SUBCUTANEOUS
Status: DISCONTINUED | OUTPATIENT
Start: 2018-06-20 | End: 2018-06-22

## 2018-06-20 RX ADMIN — Medication 10 ML: at 05:33

## 2018-06-20 RX ADMIN — HYDROMORPHONE HYDROCHLORIDE 1 MG: 1 INJECTION, SOLUTION INTRAMUSCULAR; INTRAVENOUS; SUBCUTANEOUS at 13:46

## 2018-06-20 RX ADMIN — MORPHINE SULFATE 15 MG: 15 TABLET ORAL at 12:22

## 2018-06-20 RX ADMIN — PIPERACILLIN SODIUM AND TAZOBACTAM SODIUM 3.38 G: 3; .375 INJECTION, POWDER, LYOPHILIZED, FOR SOLUTION INTRAVENOUS at 12:23

## 2018-06-20 RX ADMIN — PIPERACILLIN SODIUM AND TAZOBACTAM SODIUM 3.38 G: 3; .375 INJECTION, POWDER, LYOPHILIZED, FOR SOLUTION INTRAVENOUS at 20:04

## 2018-06-20 RX ADMIN — HYDROMORPHONE HYDROCHLORIDE 1 MG: 1 INJECTION, SOLUTION INTRAMUSCULAR; INTRAVENOUS; SUBCUTANEOUS at 18:32

## 2018-06-20 RX ADMIN — HYDROMORPHONE HYDROCHLORIDE 1 MG: 1 INJECTION, SOLUTION INTRAMUSCULAR; INTRAVENOUS; SUBCUTANEOUS at 00:14

## 2018-06-20 RX ADMIN — MIRABEGRON 50 MG: 25 TABLET, FILM COATED, EXTENDED RELEASE ORAL at 08:39

## 2018-06-20 RX ADMIN — ONDANSETRON 4 MG: 2 INJECTION INTRAMUSCULAR; INTRAVENOUS at 05:33

## 2018-06-20 RX ADMIN — GLYCOPYRROLATE 1 MG: 1 TABLET ORAL at 08:39

## 2018-06-20 RX ADMIN — METHYLPREDNISOLONE SODIUM SUCCINATE 60 MG: 125 INJECTION, POWDER, FOR SOLUTION INTRAMUSCULAR; INTRAVENOUS at 21:55

## 2018-06-20 RX ADMIN — GLYCOPYRROLATE 1 MG: 1 TABLET ORAL at 18:14

## 2018-06-20 RX ADMIN — LEVETIRACETAM 750 MG: 100 SOLUTION ORAL at 18:14

## 2018-06-20 RX ADMIN — LEVETIRACETAM 750 MG: 100 SOLUTION ORAL at 08:39

## 2018-06-20 RX ADMIN — HYDROMORPHONE HYDROCHLORIDE 1 MG: 1 INJECTION, SOLUTION INTRAMUSCULAR; INTRAVENOUS; SUBCUTANEOUS at 21:55

## 2018-06-20 RX ADMIN — Medication 10 ML: at 21:55

## 2018-06-20 RX ADMIN — METHYLPREDNISOLONE SODIUM SUCCINATE 60 MG: 125 INJECTION, POWDER, FOR SOLUTION INTRAMUSCULAR; INTRAVENOUS at 13:46

## 2018-06-20 RX ADMIN — PIPERACILLIN SODIUM AND TAZOBACTAM SODIUM 3.38 G: 3; .375 INJECTION, POWDER, LYOPHILIZED, FOR SOLUTION INTRAVENOUS at 03:21

## 2018-06-20 RX ADMIN — METHYLPREDNISOLONE SODIUM SUCCINATE 125 MG: 125 INJECTION, POWDER, FOR SOLUTION INTRAMUSCULAR; INTRAVENOUS at 05:33

## 2018-06-20 RX ADMIN — HYDROMORPHONE HYDROCHLORIDE 1 MG: 1 INJECTION, SOLUTION INTRAMUSCULAR; INTRAVENOUS; SUBCUTANEOUS at 04:18

## 2018-06-20 RX ADMIN — Medication 10 ML: at 04:18

## 2018-06-20 RX ADMIN — DEXTROSE MONOHYDRATE, SODIUM CHLORIDE, AND POTASSIUM CHLORIDE 100 ML/HR: 50; 4.5; 1.49 INJECTION, SOLUTION INTRAVENOUS at 06:48

## 2018-06-20 RX ADMIN — DEXTROSE MONOHYDRATE, SODIUM CHLORIDE, AND POTASSIUM CHLORIDE 100 ML/HR: 50; 4.5; 1.49 INJECTION, SOLUTION INTRAVENOUS at 16:56

## 2018-06-20 RX ADMIN — HYDROMORPHONE HYDROCHLORIDE 1 MG: 1 INJECTION, SOLUTION INTRAMUSCULAR; INTRAVENOUS; SUBCUTANEOUS at 08:38

## 2018-06-20 RX ADMIN — BUPROPION HYDROCHLORIDE 150 MG: 150 TABLET, EXTENDED RELEASE ORAL at 08:39

## 2018-06-20 RX ADMIN — Medication 10 ML: at 13:52

## 2018-06-20 NOTE — PROGRESS NOTES
[facility]  [address] [facility], [address], [address] [place] [address]  [phone]      Medical Progress Note      NAME: Chase Tatum   :  1975  MRM:  [id number]    Date/Time: 2018  10:27 AM       Assessment and Plan:     Crohn's regional enteritis / Acute abd pain / leukocytosis - Distal ileum narrowing on CT. No evidence of sepsis. GI consulted and plans colonoscopy this AM.  Will keep NPO, continue IVF, empiric IV Zosyn, IV solumedrol. Prn IV dilaudid. Bentyl and glycopyrrolate.     Mild malnutrition / poor PO intake - POA, acute, due to above. Consult Nutrition     Hx Epilepsy - Continue Keppra     Depression - Continue Wellbutrin       Subjective:     Chief Complaint:  Abd pain worse, didn't sleep, not well relieved by 1mg dialudid. ROS:  (bold if positive, if negative)    Abd Pain  Tolerating PT  NPO        Objective:     Last 24hrs VS reviewed since prior progress note.  Most recent are:    Visit Vitals    /63 (BP 1 Location: Left arm, BP Patient Position: At rest)    Pulse 78    Temp 98.2 °F (36.8 °C)    Resp 16    Ht 5' 3\" (1.6 m)    Wt 61.2 kg (135 lb)    SpO2 98%    BMI 23.91 kg/m2     SpO2 Readings from Last 6 Encounters:   18 98%   18 100%   18 96%   16 100%   16 99%   10/26/16 100%        No intake or output data in the 24 hours ending 18 1027     Physical Exam:    Gen:  Well-developed, well-nourished, in no acute distress  HEENT:  Pink conjunctivae, PERRL, hearing intact to voice, moist mucous membranes  Neck:  Supple, without masses, thyroid non-tender  Resp:  No accessory muscle use, clear breath sounds without wheezes rales or rhonchi  Card:  No murmurs, normal S1, S2 without thrills, bruits or peripheral edema  Abd:  Soft, mildly-tender, non-distended, reduced bowel sounds are present, no mass  Lymph:  No cervical or inguinal adenopathy  Musc:  No cyanosis or clubbing  Skin:  No rashes or ulcers, skin turgor is good  Neuro:  Cranial nerves are grossly intact, no focal motor weakness, follows commands appropriately  Psych:  Good insight, oriented to person, place and time, alert    Telemetry reviewed:   normal sinus rhythm  __________________________________________________________________  Medications Reviewed: (see below)  Medications:     Current Facility-Administered Medications   Medication Dose Route Frequency    methylPREDNISolone (PF) (SOLU-MEDROL) injection 125 mg  125 mg IntraVENous Q8H    piperacillin-tazobactam (ZOSYN) 3.375 g in 0.9% sodium chloride (MBP/ADV) 100 mL  3.375 g IntraVENous Q8H    buPROPion SR (WELLBUTRIN SR) tablet 150 mg  150 mg Oral DAILY    dicyclomine (BENTYL) tablet 20 mg  20 mg Oral QID PRN    glycopyrrolate (ROBINUL) tablet 1 mg  1 mg Oral BID    levETIRAcetam (KEPPRA) oral solution 750 mg  750 mg Oral BID    mirabegron ER (MYRBETRIQ) tablet 50 mg  50 mg Oral DAILY    dextrose 5% - 0.45% NaCl with KCl 20 mEq/L infusion  100 mL/hr IntraVENous CONTINUOUS    sodium chloride (NS) flush 5-10 mL  5-10 mL IntraVENous Q8H    sodium chloride (NS) flush 5-10 mL  5-10 mL IntraVENous PRN    acetaminophen (TYLENOL) tablet 650 mg  650 mg Oral Q4H PRN    HYDROmorphone (DILAUDID) syringe 1 mg  1 mg IntraVENous Q4H PRN    naloxone (NARCAN) injection 0.4 mg  0.4 mg IntraVENous PRN    diphenhydrAMINE (BENADRYL) injection 12.5 mg  12.5 mg IntraVENous Q4H PRN    ondansetron (ZOFRAN) injection 4 mg  4 mg IntraVENous Q6H PRN        Lab Data Reviewed: (see below)  Lab Review:     Recent Labs      06/20/18   0308  06/19/18   1810   WBC  8.2  11.6*   HGB  13.4  13.3   HCT  38.4  38.4   PLT  248  266     Recent Labs      06/20/18   0308  06/19/18   1810   NA  134*  138   K  4.1  3.4*   CL  103  105   CO2  24  26   GLU  158*  89   BUN  7  8   CREA  0.96  0.80   CA  8.6  8.9   MG  1.8   --    PHOS  3.2   --    ALB  3.4*   --    TBILI  0.3   --    SGOT  18   --    ALT  28   --      Lab Results Component Value Date/Time    Glucose (POC) 248 (H) 10/26/2016 04:03 PM    Glucose (POC) 79 10/26/2016 03:13 PM    Glucose (POC) 97 01/02/2011 11:35 PM    Glucose (POC) 75 01/02/2011 08:58 PM     No results for input(s): PH, PCO2, PO2, HCO3, FIO2 in the last 72 hours. No results for input(s): INR in the last 72 hours. No lab exists for component: INREXT  All Micro Results     None          I have reviewed notes of prior 24hr.     Other pertinent lab: none    Total time spent with patient: 28 5 [address] discussed with: Patient, Care Manager, Nursing Staff, Consultant/Specialist and >50% of time spent in counseling and coordination of care    Discussed:  Care Plan and D/C Planning    Prophylaxis:  H2B/PPI    Disposition:  Home w/Family           ___________________________________________________    Attending Physician: Jacob Harvey MD

## 2018-06-20 NOTE — PROGRESS NOTES
0730  Bedside and Verbal shift change report given to Antonette Cowden, RN (oncoming nurse) by Raphael Falcon RN (offgoing nurse). Report included the following information SBAR, Kardex, Intake/Output, MAR, Recent Results and Med Rec Status. 2108  Pt NPO and wants to see if she can have sips of ginger ale with her keppra. Dr. Josy Thompson stated that is ok and to make NPO after midnight.

## 2018-06-20 NOTE — CONSULTS
[facility]. [address] [place] [address] NP  [phone]                    GASTROENTEROLOGY CONSULTATION NOTE              NAME:  Mac Fay   :   1975   MRN:   [id number]       Referring Physician:   Dr. Stephen Velez Date:   2018 11:06 AM    Chief Complaint:    Abdominal Pain     History of Present Illness:    Patient is a 38 y.o. female who we were asked to see in consultation for the above complaints. The patient has been experiencing suprapubic pain since April that has acutely worsened over the past 2 weeks. Her pain is in her suprapubic area. She has only been able to tolerate liquids. She endorses nausea, denies fevers and chills. She states she has chronic diarrhea after her surgery for the J pouch but is having more frequent episodes of it. She denies blood in stool. States she has lost 5 lbs over the past week. She has a history of Crohn's disease and has a J pouch. PMH:  Past Medical History:   Diagnosis Date    Bipolar 2 disorder ([address]     Colitis, ulcerative chronic (HCC)     Crohn disease ([address]     Depression     Epilepsy ([address]     Pancreatitis 2010    Seizure ([address]     Ulcerative colitis ([address] 2010       PSH:  Past Surgical History:   Procedure Laterality Date    FLEXIBLE SIGMOIDOSCOPY N/A 10/26/2016    FLEXIBLE SIGMOIDOSCOPY  performed by Lorette Bloch, MD at [facility] HX APPENDECTOMY       [facility]    with J pouch    HX TONSIL AND ADENOIDECTOMY      age 6       Allergies: Allergies   Allergen Reactions    Vancomycin Hives       Home Medications:  Prior to Admission Medications   Prescriptions Last Dose Informant Patient Reported? Taking? adalimumab (HUMIRA) 40 mg/0.8 mL injection 2018  Yes Yes   Si mg by SubCUTAneous route every fourteen (14) days. Every other Monday   buPROPion SR (WELLBUTRIN SR) 150 mg SR tablet 2018 at AM  Yes Yes   Sig: Take 150 mg by mouth daily.    cholecalciferol (VITAMIN D3) 5,000 unit capsule 6/19/2018 at AM  Yes Yes   Sig: Take 10,000 Units by mouth daily. dicyclomine (BENTYL) 20 mg tablet   Yes Yes   Sig: Take 20 mg by mouth four (4) times daily as needed. glycopyrrolate (ROBINUL) 1 mg tablet 6/19/2018 at AM  Yes Yes   Sig: Take 1 mg by mouth two (2) times a day. Indications: Diarrhea   levetiracetam (KEPPRA) 100 mg/mL solution 6/19/2018 at AM  Yes Yes   Sig: Take 750 mg by mouth two (2) times a day. mirabegron ER (MYRBETRIQ) 50 mg ER tablet 6/19/2018 at AM  Yes Yes   Sig: Take 50 mg by mouth daily.       Facility-Administered Medications: None       Hospital Medications:  Current Facility-Administered Medications   Medication Dose Route Frequency    HYDROmorphone (DILAUDID) syringe 1 mg  1 mg IntraVENous Q3H PRN    morphine IR (MS IR) tablet 15 mg  15 mg Oral Q4H PRN    methylPREDNISolone (PF) (SOLU-MEDROL) injection 125 mg  125 mg IntraVENous Q8H    piperacillin-tazobactam (ZOSYN) 3.375 g in 0.9% sodium chloride (MBP/ADV) 100 mL  3.375 g IntraVENous Q8H    buPROPion SR (WELLBUTRIN SR) tablet 150 mg  150 mg Oral DAILY    dicyclomine (BENTYL) tablet 20 mg  20 mg Oral QID PRN    glycopyrrolate (ROBINUL) tablet 1 mg  1 mg Oral BID    levETIRAcetam (KEPPRA) oral solution 750 mg  750 mg Oral BID    mirabegron ER (MYRBETRIQ) tablet 50 mg  50 mg Oral DAILY    dextrose 5% - 0.45% NaCl with KCl 20 mEq/L infusion  100 mL/hr IntraVENous CONTINUOUS    sodium chloride (NS) flush 5-10 mL  5-10 mL IntraVENous Q8H    sodium chloride (NS) flush 5-10 mL  5-10 mL IntraVENous PRN    acetaminophen (TYLENOL) tablet 650 mg  650 mg Oral Q4H PRN    naloxone (NARCAN) injection 0.4 mg  0.4 mg IntraVENous PRN    diphenhydrAMINE (BENADRYL) injection 12.5 mg  12.5 mg IntraVENous Q4H PRN    ondansetron (ZOFRAN) injection 4 mg  4 mg IntraVENous Q6H PRN       Social History:  Social History   Substance Use Topics    Smoking status: Never Smoker    Smokeless tobacco: Not on file    Alcohol use No       Family History:  Family History   Problem Relation Age of Onset    Family history unknown: Yes       Review of Systems:  Constitutional: negative fever, negative chills, negative weight loss  Eyes:   negative visual changes  ENT:   negative sore throat, tongue or lip swelling  Respiratory:  negative cough, negative dyspnea  Cards:  negative for chest pain, palpitations, lower extremity edema  GI:   See HPI  :  negative for frequency, dysuria  Integument:  negative for rash and pruritus  Heme:  negative for easy bruising and gum/nose bleeding  Musculoskel: negative for myalgias,  back pain and muscle weakness  Neuro: negative for headaches, dizziness, vertigo  Psych:  negative for feelings of anxiety, depression     Objective:   Patient Vitals for the past 8 hrs:   BP Temp Pulse Resp SpO2   06/20/18 0734 100/63 98.2 °F (36.8 °C) 78 16 98 %   06/20/18 0431 114/71 98.2 °F (36.8 °C) 88 16 97 %             EXAM:     NEURO-alert, oriented x3, affect appropriate   HEENT-Head: Normocephalic, no lesions, without obvious abnormality. LUNGS-clear to auscultation bilaterally    COR-regular rate and rhythym     ABD- soft, TTP in RLQ and LLQ. Bowel sounds Hypoactive. No masses,  no organomegaly     EXT-no edema    Skin - No rash     Data Review     Recent Labs      06/20/18   0308  06/19/18   1810   WBC  8.2  11.6*   HGB  13.4  13.3   HCT  38.4  38.4   PLT  248  266     Recent Labs      06/20/18   0308  06/19/18   1810   NA  134*  138   K  4.1  3.4*   CL  103  105   CO2  24  26   BUN  7  8   CREA  0.96  0.80   GLU  158*  89   PHOS  3.2   --    CA  8.6  8.9     Recent Labs      06/20/18   0308   SGOT  18   AP  60   TP  6.6   ALB  3.4*   GLOB  3.2     No results for input(s): INR, PTP, APTT in the last 72 hours.     No lab exists for component: INREXT    Patient Active Problem List   Diagnosis Code    Ulcerative colitis ([facility] K51.90    Pancreatitis K85.90    [facility] K50.90    Epilepsy ([facility] G40.909    Depression F32.9       Assessment and Plan:    Abdominal  Pain:  Likely related to new narrowing of distal 4cm of illeum noted on small bowel follow through. - CLD today. - Continue IVF. - Continue IV ABX.  - Continue IV steroids - decreased dose to 60mg q 8  - Continue diclycomine. - Will tentative plan for pouchoscopy on Friday after her bowel inflammation has had time to decrease. Diarrhea: Acute on Chronic  - Ordered stool studies.  - Monitor and treat electrolyte abnormalities  - Supportive Care. Will continue to follow. Thanks for allowing me to participate in the care of this patient.   Signed By: Prerna Amin NP     6/20/2018  11:06 AM

## 2018-06-20 NOTE — PROGRESS NOTES
Bedside and Verbal shift change report given to Dillan Shetty (oncoming nurse) by Chetna Alvarez (offgoing nurse). Report included the following information SBAR, Kardex, MAR, Accordion and Recent Results.

## 2018-06-20 NOTE — PROGRESS NOTES
2100  Primary Nurse Tex Wolff RN and Berlin Vincent RN performed a dual skin assessment on this patient: abd scar noted  Abisai score is 23      2045  TRANSFER - IN REPORT:    Verbal report received from Julia Diallo [facility] (name) on Jamarcus Callahan  being received from ED (unit) for routine progression of care      Report consisted of patients Situation, Background, Assessment and   Recommendations(SBAR). Information from the following report(s) SBAR, Kardex, Intake/Output, MAR, Recent Results and Med Rec Status was reviewed with the receiving nurse. Opportunity for questions and clarification was provided. Assessment completed upon patients arrival to unit and care assumed.

## 2018-06-20 NOTE — PROGRESS NOTES
MAYURI Note:  Pt was in the bathroom and unable to talk at this time. Will f/u in the morning for d/c planning. GLENNA Moss

## 2018-06-20 NOTE — PROGRESS NOTES
Rounded on [employer] patients and provided Anointing of the Sick at request of patient    Fr. Jos Bernardo

## 2018-06-20 NOTE — PROGRESS NOTES
Nutrition Assessment:    RECOMMENDATIONS/INTERVENTION(S):   Start Clear liquid diet per GI NP  Monitor PO intakes, Gi status, weight,       ASSESSMENT:   6/20: 38 yr old female admitted with abdominal pain, increasing over last 1-2 months. Pt with hx of J-pouch - 23 yrs ago 2/2 hx of ulcerative colitis. Pt states she's lost about 5 lbs over last week or so. She has only been tolerating clear liquids at home PTA. Pt usually avoids trigger foods for her (beef, fatty foods, etc.) Pt knowledgeable about foods she can tolerate usually. Discussed probiotics and elimination of some foods. GI NP states pt will be on CLD until Friday when she will have colonoscopy to allow GI tract to calm down. Bg 158 mg/dL. Na 134. SUBJECTIVE/OBJECTIVE:   Diet Order: Clear liquids  % Eaten:  No data found. Pertinent Medications: [x] Reviewed    Past Medical History:   Diagnosis Date    B[facility] 2 disorder ([facility]     Colitis, ulcerative chronic (HCC)     Crohn disease ([facility]     Depression     [facility]     Pancreatitis 9/13/2010    Seizure ([facility]     Ulcerative colitis ([facility] 9/13/2010        Chemistries:  Lab Results   Component Value Date/Time    Sodium 134 (L) 06/20/2018 03:08 AM    Potassium 4.1 06/20/2018 03:08 AM    Chloride 103 06/20/2018 03:08 AM    CO2 24 06/20/2018 03:08 AM    Anion gap 7 06/20/2018 03:08 AM    Glucose 158 (H) 06/20/2018 03:08 AM    BUN 7 06/20/2018 03:08 AM    Creatinine 0.96 06/20/2018 03:08 AM    BUN/Creatinine ratio 7 (L) 06/20/2018 03:08 AM    GFR est AA >60 06/20/2018 03:08 AM    GFR est non-AA >60 06/20/2018 03:08 AM    Calcium 8.6 06/20/2018 03:08 AM    AST (SGOT) 18 06/20/2018 03:08 AM    Alk.  phosphatase 60 06/20/2018 03:08 AM    Protein, total 6.6 06/20/2018 03:08 AM    Albumin 3.4 (L) 06/20/2018 03:08 AM    Globulin 3.2 06/20/2018 03:08 AM    A-G Ratio 1.1 06/20/2018 03:08 AM    ALT (SGPT) 28 06/20/2018 03:08 AM      Anthropometrics: Height: 5' 3\" (160 cm) Weight: 61.2 kg (135 lb)    IBW (%IBW): 52.2 kg (115 lb) ( ) UBW (%UBW):   (  %)    BMI: Body mass index is 23.91 kg/(m^2). This BMI is indicative of:     [] Underweight    [x] Normal    [] Overweight    []  Obesity    []  Extreme Obesity (BMI>40)    Estimated Nutrition Needs (Based on): 1600 Kcals/day (BMR(1231x1.3)) , 61 g (-73g/day(1.0-1.2g/kg)) Protein  Carbohydrate: At Least 130 g/day  Fluids: 1600 mL/day (1mL/kg rounded to 50 mL)    Last BM: 6/19 - loose (baseline)  []Active     []Hyperactive  []Hypoactive       [] Absent   BS  Skin:    [x] Intact   [] Incision  [] Breakdown   [] DTI   [] Tears/Excoriation/Abrasion  []Edema [] Other: Wt Readings from Last 30 Encounters:   06/19/18 61.2 kg (135 lb)   03/23/18 59 kg (130 lb)   01/25/18 65.2 kg (143 lb 11.8 oz)   12/01/16 61.2 kg (135 lb)   11/06/16 61.2 kg (135 lb)   10/26/16 61.2 kg (135 lb)   10/18/16 64.4 kg (142 lb)   04/21/15 65.8 kg (145 lb)   11/08/13 72.6 kg (160 lb)   01/10/13 72.6 kg (160 lb)   11/07/12 72.6 kg (160 lb)   04/03/12 68 kg (150 lb)   07/05/11 45.4 kg (100 lb)   06/17/11 65.8 kg (145 lb)      NUTRITION DIAGNOSES:   Problem:  Altered GI function Inadequate energy intake   Etiology: related to alteration in structure function of GI tract decreased ability to consume sufficient energy   Signs/Symptoms: as evidenced by J-pouch, Crohn's flare, abominal pain NPO, CLD, low intakes x 1-2 weeks.      NUTRITION INTERVENTIONS:  Meals/Snacks: General/healthful diet                  GOAL:   Pt will consume >50% of meals and ONS within 2-4 days    Cultural, Protestant, or Ethnic Dietary Needs: None     LEARNING NEEDS (Diet, Food/Nutrient-Drug Interaction):    [x] None Identified   [] Identified and Education Provided/Documented   [] Identified and Pt declined/was not appropriate      [x] Interdisciplinary Care Plan Reviewed/Documented    [x] Discharge Needs:    TBD   [] No Nutrition Related Discharge Needs    NUTRITION RISK:   Pt Is At Nutrition Risk  [x]     No Nutrition Risk Identified  []       PT SEEN FOR:    [x]  MD Consult: []Calorie Count      []Diabetic Diet Education        []Diet Education     []Electrolyte Management     [x]General Nutrition Management and Supplements     []Management of Tube Feeding     []TPN Recommendations    []  RN Referral:  []MST score >=2     []Enteral/Parenteral Nutrition PTA     []Pregnant: Gestational DM or Multigestation                 [] Pressure Ulcer      []  Low BMI      []  Length of Stay       [] Dysphagia Diet     [] Ventilator      [] Follow-Up        Previous Recommendations:   [] Implemented          [] Not Implemented          [x] Not Applicable    Previous Goal:   [] Met              [] Progressing Towards Goal              [] Not Progressing Towards Goal   [x] Not Applicable              Melissa Weinstein, [address] [place] [address]  Pager: [phone]  Office: [phone]

## 2018-06-20 NOTE — ED NOTES
Report called and given to [facility] Receiving RN has no further questions at this time. Nurse notified of recent pain medications given along with infusions. Receiving RN aware of current plan of care, pt aware of same.

## 2018-06-21 ENCOUNTER — ANESTHESIA (OUTPATIENT)
Dept: ENDOSCOPY | Age: 43
DRG: 386 | End: 2018-06-21
Payer: MEDICARE

## 2018-06-21 ENCOUNTER — ANESTHESIA EVENT (OUTPATIENT)
Dept: ENDOSCOPY | Age: 43
DRG: 386 | End: 2018-06-21
Payer: MEDICARE

## 2018-06-21 LAB
ANION GAP SERPL CALC-SCNC: 7 MMOL/L (ref 5–15)
APPEARANCE UR: CLEAR
BACTERIA URNS QL MICRO: NEGATIVE /HPF
BILIRUB UR QL: NEGATIVE
BUN SERPL-MCNC: 3 MG/DL (ref 6–20)
BUN/CREAT SERPL: 4 (ref 12–20)
C DIFF TOX GENS STL QL NAA+PROBE: NEGATIVE
CALCIUM SERPL-MCNC: 8.2 MG/DL (ref 8.5–10.1)
CHLORIDE SERPL-SCNC: 104 MMOL/L (ref 97–108)
CO2 SERPL-SCNC: 27 MMOL/L (ref 21–32)
COLOR UR: ABNORMAL
CREAT SERPL-MCNC: 0.8 MG/DL (ref 0.55–1.02)
EPITH CASTS URNS QL MICRO: ABNORMAL /LPF
ERYTHROCYTE [DISTWIDTH] IN BLOOD BY AUTOMATED COUNT: 11.3 % (ref 11.5–14.5)
GLUCOSE SERPL-MCNC: 145 MG/DL (ref 65–100)
GLUCOSE UR STRIP.AUTO-MCNC: 100 MG/DL
HCG UR QL: NEGATIVE
HCT VFR BLD AUTO: 37.8 % (ref 35–47)
HGB BLD-MCNC: 13 G/DL (ref 11.5–16)
HGB UR QL STRIP: NEGATIVE
HYALINE CASTS URNS QL MICRO: ABNORMAL /LPF (ref 0–5)
KETONES UR QL STRIP.AUTO: NEGATIVE MG/DL
LEUKOCYTE ESTERASE UR QL STRIP.AUTO: NEGATIVE
MAGNESIUM SERPL-MCNC: 2.2 MG/DL (ref 1.6–2.4)
MCH RBC QN AUTO: 32.2 PG (ref 26–34)
MCHC RBC AUTO-ENTMCNC: 34.4 G/DL (ref 30–36.5)
MCV RBC AUTO: 93.6 FL (ref 80–99)
NITRITE UR QL STRIP.AUTO: NEGATIVE
NRBC # BLD: 0 K/UL (ref 0–0.01)
NRBC BLD-RTO: 0 PER 100 WBC
PH UR STRIP: 7 [PH] (ref 5–8)
PLATELET # BLD AUTO: 269 K/UL (ref 150–400)
PMV BLD AUTO: 8.8 FL (ref 8.9–12.9)
POTASSIUM SERPL-SCNC: 3.7 MMOL/L (ref 3.5–5.1)
PROT UR STRIP-MCNC: NEGATIVE MG/DL
RBC # BLD AUTO: 4.04 M/UL (ref 3.8–5.2)
RBC #/AREA URNS HPF: ABNORMAL /HPF (ref 0–5)
SODIUM SERPL-SCNC: 138 MMOL/L (ref 136–145)
SP GR UR REFRACTOMETRY: 1.01 (ref 1–1.03)
UA: UC IF INDICATED,UAUC: ABNORMAL
UROBILINOGEN UR QL STRIP.AUTO: 0.2 EU/DL (ref 0.2–1)
WBC # BLD AUTO: 23.8 K/UL (ref 3.6–11)
WBC URNS QL MICRO: ABNORMAL /HPF (ref 0–4)

## 2018-06-21 PROCEDURE — 0DBB8ZX EXCISION OF ILEUM, VIA NATURAL OR ARTIFICIAL OPENING ENDOSCOPIC, DIAGNOSTIC: ICD-10-PCS | Performed by: INTERNAL MEDICINE

## 2018-06-21 PROCEDURE — 74011250636 HC RX REV CODE- 250/636

## 2018-06-21 PROCEDURE — 74011250637 HC RX REV CODE- 250/637: Performed by: INTERNAL MEDICINE

## 2018-06-21 PROCEDURE — 81001 URINALYSIS AUTO W/SCOPE: CPT | Performed by: NURSE PRACTITIONER

## 2018-06-21 PROCEDURE — 74011250636 HC RX REV CODE- 250/636: Performed by: ANESTHESIOLOGY

## 2018-06-21 PROCEDURE — 83735 ASSAY OF MAGNESIUM: CPT | Performed by: INTERNAL MEDICINE

## 2018-06-21 PROCEDURE — 88305 TISSUE EXAM BY PATHOLOGIST: CPT | Performed by: INTERNAL MEDICINE

## 2018-06-21 PROCEDURE — 77030009426 HC FCPS BIOP ENDOSC BSC -B: Performed by: INTERNAL MEDICINE

## 2018-06-21 PROCEDURE — 85027 COMPLETE CBC AUTOMATED: CPT | Performed by: INTERNAL MEDICINE

## 2018-06-21 PROCEDURE — 76060000031 HC ANESTHESIA FIRST 0.5 HR: Performed by: INTERNAL MEDICINE

## 2018-06-21 PROCEDURE — 74011250636 HC RX REV CODE- 250/636: Performed by: INTERNAL MEDICINE

## 2018-06-21 PROCEDURE — 76040000019: Performed by: INTERNAL MEDICINE

## 2018-06-21 PROCEDURE — 0D7B8ZZ DILATION OF ILEUM, VIA NATURAL OR ARTIFICIAL OPENING ENDOSCOPIC: ICD-10-PCS | Performed by: INTERNAL MEDICINE

## 2018-06-21 PROCEDURE — 74011250636 HC RX REV CODE- 250/636: Performed by: NURSE PRACTITIONER

## 2018-06-21 PROCEDURE — C1726 CATH, BAL DIL, NON-VASCULAR: HCPCS | Performed by: INTERNAL MEDICINE

## 2018-06-21 PROCEDURE — 81025 URINE PREGNANCY TEST: CPT

## 2018-06-21 PROCEDURE — 65270000029 HC RM PRIVATE

## 2018-06-21 PROCEDURE — 74011000250 HC RX REV CODE- 250

## 2018-06-21 PROCEDURE — 36415 COLL VENOUS BLD VENIPUNCTURE: CPT | Performed by: INTERNAL MEDICINE

## 2018-06-21 PROCEDURE — 74011000258 HC RX REV CODE- 258: Performed by: INTERNAL MEDICINE

## 2018-06-21 PROCEDURE — 80048 BASIC METABOLIC PNL TOTAL CA: CPT | Performed by: INTERNAL MEDICINE

## 2018-06-21 RX ORDER — ATROPINE SULFATE 0.1 MG/ML
0.4 INJECTION INTRAVENOUS
Status: DISCONTINUED | OUTPATIENT
Start: 2018-06-21 | End: 2018-06-21 | Stop reason: HOSPADM

## 2018-06-21 RX ORDER — LIDOCAINE HYDROCHLORIDE 20 MG/ML
INJECTION, SOLUTION EPIDURAL; INFILTRATION; INTRACAUDAL; PERINEURAL AS NEEDED
Status: DISCONTINUED | OUTPATIENT
Start: 2018-06-21 | End: 2018-06-21 | Stop reason: HOSPADM

## 2018-06-21 RX ORDER — EPINEPHRINE 0.1 MG/ML
1 INJECTION INTRACARDIAC; INTRAVENOUS
Status: DISCONTINUED | OUTPATIENT
Start: 2018-06-21 | End: 2018-06-21 | Stop reason: HOSPADM

## 2018-06-21 RX ORDER — PROPOFOL 10 MG/ML
INJECTION, EMULSION INTRAVENOUS AS NEEDED
Status: DISCONTINUED | OUTPATIENT
Start: 2018-06-21 | End: 2018-06-21 | Stop reason: HOSPADM

## 2018-06-21 RX ORDER — MIDAZOLAM HYDROCHLORIDE 1 MG/ML
.25-5 INJECTION, SOLUTION INTRAMUSCULAR; INTRAVENOUS
Status: DISCONTINUED | OUTPATIENT
Start: 2018-06-21 | End: 2018-06-21 | Stop reason: HOSPADM

## 2018-06-21 RX ORDER — FLUMAZENIL 0.1 MG/ML
0.2 INJECTION INTRAVENOUS
Status: DISCONTINUED | OUTPATIENT
Start: 2018-06-21 | End: 2018-06-21 | Stop reason: HOSPADM

## 2018-06-21 RX ORDER — ENOXAPARIN SODIUM 100 MG/ML
40 INJECTION SUBCUTANEOUS EVERY 24 HOURS
Status: DISCONTINUED | OUTPATIENT
Start: 2018-06-21 | End: 2018-06-24 | Stop reason: HOSPADM

## 2018-06-21 RX ORDER — SODIUM CHLORIDE 9 MG/ML
50 INJECTION, SOLUTION INTRAVENOUS CONTINUOUS
Status: DISPENSED | OUTPATIENT
Start: 2018-06-21 | End: 2018-06-21

## 2018-06-21 RX ORDER — PROPOFOL 10 MG/ML
INJECTION, EMULSION INTRAVENOUS
Status: DISCONTINUED | OUTPATIENT
Start: 2018-06-21 | End: 2018-06-21 | Stop reason: HOSPADM

## 2018-06-21 RX ORDER — SODIUM CHLORIDE 9 MG/ML
INJECTION, SOLUTION INTRAVENOUS
Status: DISCONTINUED | OUTPATIENT
Start: 2018-06-21 | End: 2018-06-21 | Stop reason: HOSPADM

## 2018-06-21 RX ORDER — NALOXONE HYDROCHLORIDE 0.4 MG/ML
0.4 INJECTION, SOLUTION INTRAMUSCULAR; INTRAVENOUS; SUBCUTANEOUS
Status: DISCONTINUED | OUTPATIENT
Start: 2018-06-21 | End: 2018-06-21 | Stop reason: HOSPADM

## 2018-06-21 RX ORDER — DEXTROMETHORPHAN/PSEUDOEPHED 2.5-7.5/.8
1.2 DROPS ORAL
Status: DISCONTINUED | OUTPATIENT
Start: 2018-06-21 | End: 2018-06-21 | Stop reason: HOSPADM

## 2018-06-21 RX ADMIN — METHYLPREDNISOLONE SODIUM SUCCINATE 60 MG: 125 INJECTION, POWDER, FOR SOLUTION INTRAMUSCULAR; INTRAVENOUS at 21:00

## 2018-06-21 RX ADMIN — MEPERIDINE HYDROCHLORIDE 12.5 MG: 25 INJECTION, SOLUTION INTRAMUSCULAR; INTRAVENOUS; SUBCUTANEOUS at 11:28

## 2018-06-21 RX ADMIN — GLYCOPYRROLATE 1 MG: 1 TABLET ORAL at 08:56

## 2018-06-21 RX ADMIN — GLYCOPYRROLATE 1 MG: 1 TABLET ORAL at 17:41

## 2018-06-21 RX ADMIN — ONDANSETRON 4 MG: 2 INJECTION INTRAMUSCULAR; INTRAVENOUS at 03:30

## 2018-06-21 RX ADMIN — PIPERACILLIN SODIUM AND TAZOBACTAM SODIUM 3.38 G: 3; .375 INJECTION, POWDER, LYOPHILIZED, FOR SOLUTION INTRAVENOUS at 12:13

## 2018-06-21 RX ADMIN — LIDOCAINE HYDROCHLORIDE 60 MG: 20 INJECTION, SOLUTION EPIDURAL; INFILTRATION; INTRACAUDAL; PERINEURAL at 10:39

## 2018-06-21 RX ADMIN — PROPOFOL 100 MCG/KG/MIN: 10 INJECTION, EMULSION INTRAVENOUS at 10:39

## 2018-06-21 RX ADMIN — PIPERACILLIN SODIUM AND TAZOBACTAM SODIUM 3.38 G: 3; .375 INJECTION, POWDER, LYOPHILIZED, FOR SOLUTION INTRAVENOUS at 20:47

## 2018-06-21 RX ADMIN — DIPHENHYDRAMINE HYDROCHLORIDE 12.5 MG: 50 INJECTION, SOLUTION INTRAMUSCULAR; INTRAVENOUS at 03:04

## 2018-06-21 RX ADMIN — Medication 10 ML: at 06:58

## 2018-06-21 RX ADMIN — PIPERACILLIN SODIUM AND TAZOBACTAM SODIUM 3.38 G: 3; .375 INJECTION, POWDER, LYOPHILIZED, FOR SOLUTION INTRAVENOUS at 03:31

## 2018-06-21 RX ADMIN — HYDROMORPHONE HYDROCHLORIDE 1 MG: 1 INJECTION, SOLUTION INTRAMUSCULAR; INTRAVENOUS; SUBCUTANEOUS at 01:13

## 2018-06-21 RX ADMIN — METHYLPREDNISOLONE SODIUM SUCCINATE 60 MG: 125 INJECTION, POWDER, FOR SOLUTION INTRAMUSCULAR; INTRAVENOUS at 06:58

## 2018-06-21 RX ADMIN — MAGESIUM CITRATE 296 ML: 1.75 LIQUID ORAL at 00:08

## 2018-06-21 RX ADMIN — HYDROMORPHONE HYDROCHLORIDE 1 MG: 1 INJECTION, SOLUTION INTRAMUSCULAR; INTRAVENOUS; SUBCUTANEOUS at 20:45

## 2018-06-21 RX ADMIN — DEXTROSE MONOHYDRATE, SODIUM CHLORIDE, AND POTASSIUM CHLORIDE 100 ML/HR: 50; 4.5; 1.49 INJECTION, SOLUTION INTRAVENOUS at 01:13

## 2018-06-21 RX ADMIN — Medication 10 ML: at 21:01

## 2018-06-21 RX ADMIN — SODIUM CHLORIDE: 9 INJECTION, SOLUTION INTRAVENOUS at 10:15

## 2018-06-21 RX ADMIN — PROPOFOL 100 MG: 10 INJECTION, EMULSION INTRAVENOUS at 10:39

## 2018-06-21 RX ADMIN — BUPROPION HYDROCHLORIDE 150 MG: 150 TABLET, EXTENDED RELEASE ORAL at 08:57

## 2018-06-21 RX ADMIN — METHYLPREDNISOLONE SODIUM SUCCINATE 60 MG: 125 INJECTION, POWDER, FOR SOLUTION INTRAMUSCULAR; INTRAVENOUS at 13:14

## 2018-06-21 RX ADMIN — LEVETIRACETAM 750 MG: 100 SOLUTION ORAL at 17:41

## 2018-06-21 RX ADMIN — MIRABEGRON 50 MG: 25 TABLET, FILM COATED, EXTENDED RELEASE ORAL at 08:57

## 2018-06-21 RX ADMIN — ENOXAPARIN SODIUM 40 MG: 40 INJECTION SUBCUTANEOUS at 20:49

## 2018-06-21 RX ADMIN — HYDROMORPHONE HYDROCHLORIDE 1 MG: 1 INJECTION, SOLUTION INTRAMUSCULAR; INTRAVENOUS; SUBCUTANEOUS at 06:58

## 2018-06-21 RX ADMIN — Medication 10 ML: at 13:15

## 2018-06-21 RX ADMIN — LEVETIRACETAM 750 MG: 100 SOLUTION ORAL at 08:57

## 2018-06-21 RX ADMIN — PROPOFOL 100 MG: 10 INJECTION, EMULSION INTRAVENOUS at 10:42

## 2018-06-21 RX ADMIN — DEXTROSE MONOHYDRATE, SODIUM CHLORIDE, AND POTASSIUM CHLORIDE 100 ML/HR: 50; 4.5; 1.49 INJECTION, SOLUTION INTRAVENOUS at 12:13

## 2018-06-21 RX ADMIN — HYDROMORPHONE HYDROCHLORIDE 1 MG: 1 INJECTION, SOLUTION INTRAMUSCULAR; INTRAVENOUS; SUBCUTANEOUS at 13:14

## 2018-06-21 RX ADMIN — SODIUM CHLORIDE 50 ML/HR: 900 INJECTION, SOLUTION INTRAVENOUS at 09:00

## 2018-06-21 RX ADMIN — PROPOFOL 50 MG: 10 INJECTION, EMULSION INTRAVENOUS at 10:57

## 2018-06-21 RX ADMIN — Medication 10 ML: at 03:31

## 2018-06-21 NOTE — PROCEDURES
Jonh Feldman M.D.  [phone]            2018          Colonoscopy Operative Report  Rosanna Russell  :  1975  [place] Medical Record Number:  [id number]      Indications:  History of CD with J-pouch. Noted to have worsening abdominal pain. SBFT showed stricture 4cms proximal to the rectum at the dontae-terminal ileum    :  Jeimy Peralta MD    Referring Provider: Jody Hair MD    Sedation:  MAC anesthesia Propofol    Pre-Procedural Exam:      Airway: clear,  No airway problems anticipated  Heart: RRR, without gallops or rubs  Lungs: clear bilaterally without wheezes, crackles, or rhonchi  Abdomen: soft, nontender, nondistended, bowel sounds present  Mental Status: awake, alert and oriented to person, place and time     Procedure Details:  After informed consent was obtained with all risks and benefits of procedure explained and preoperative exam completed, the patient was taken to the endoscopy suite and placed in the left lateral decubitus position. Upon sequential sedation as per above, a digital rectal exam was performed. The Olympus videocolonoscope  was inserted in the rectum and carefully advanced to the terminal ileum. The quality of preparation was excellent. The colonoscope was slowly withdrawn with careful inspection and evaluation between folds. Retroflexion in the rectum was performed. Findings:   Terminal Ileum: The J-pouch was noted and appeared to be normal. The exam was then directed to the terminal ileum. There was no evidence of active Crohn's disease in the small bowel. The scope was advanced at least 40cms beyond the rectal verge. Biopsies were obtained for histology. There was slight amount of stenosis noted at the anastomosis with mild increase in mucosal erythema. Biopsies were obtained also. Given her symptoms I elected to dilate her anastomosis with a 20mm balloon.  There was improvement in the stenosis noted after the dilation. Rectum: normal    Interventions:  Biopsies of the terminal ileum and anastomotic region. Balloon dilation of the anastomosis    Specimen Removed:  As described above    Complications: None. EBL:  None. Impression:  The terminal ileum appeared normal. There was slight increase in erythema noted at the anastomosis. There was no evidence of recurrence of CD noted. Biopsies were obtained for histology              There was slight narrowing noted at the anastomosis. This corroborates with findings on the SBFT. The stenosis was dilated with a 20mm balloon with good results and noticeable improvement in the narrowing. Recommendations:  -Await pathology.   -Clear liquid diet and advance as tolerated. -Resume normal medication(s). -If continues to do well will plan to switch her to oral prednisone and Abx tomorrow and possible discharge soon with short outpatient follow up    Discharge Disposition:  Home in the company of a  when able to ambulate.     Lashonda Patel MD  6/21/2018  11:07 AM

## 2018-06-21 NOTE — ANESTHESIA PREPROCEDURE EVALUATION
Anesthetic History   No history of anesthetic complications            Review of Systems / Medical History  Patient summary reviewed and nursing notes reviewed    Pulmonary  Within defined limits                 Neuro/Psych     seizures: well controlled    Psychiatric history    Comments: Bipolar disorder Cardiovascular                  Exercise tolerance: >4 METS     GI/Hepatic/Renal               Comments: Ulcerative colitis  Crohn's disease Endo/Other  Within defined limits           Other Findings              Physical Exam    Airway  Mallampati: II    Neck ROM: normal range of motion   Mouth opening: Normal     Cardiovascular    Rhythm: regular  Rate: normal         Dental  No notable dental hx       Pulmonary  Breath sounds clear to auscultation               Abdominal         Other Findings            Anesthetic Plan    ASA: 2  Anesthesia type: MAC          Induction: Intravenous  Anesthetic plan and risks discussed with: Patient      Informed consent obtained.

## 2018-06-21 NOTE — PROGRESS NOTES
[facility]  [facility], [place], [address] [place] [address]  [phone]      Medical Progress Note      NAME: Sheri Aguilar   :  1975  MRM:  [id number]    Date/Time: 2018  1:56 PM       Assessment and Plan:     Crohn's regional enteritis / Acute abd pain / leukocytosis - Distal ileum narrowing on CT.  No evidence of sepsis.  GI consulted and did colonoscopy this AM.  Resume clears, continue IVF, empiric IV Zosyn, IV solumedrol. Prn IV dilaudid. Bentyl and glycopyrrolate. Likely home in 2-3 days      Mild malnutrition / poor PO intake - POA, acute, due to above.  Consult Nutrition      Hx Epilepsy - Continue Keppra      Depression - Continue Wellbutrin    Leukocytosis - Presumed due to steroids. Abx as above. Cx if fever.        Subjective:     Chief Complaint:  Pain better, awaiting EGD    ROS:  (bold if positive, if negative)    Abd Pain  Tolerating PT   NPO        Objective:     Last 24hrs VS reviewed since prior progress note.  Most recent are:    Visit Vitals    /66 (BP 1 Location: Left arm, BP Patient Position: Sitting)    Pulse 88    Temp 98.7 °F (37.1 °C)    Resp 16    Ht 5' 3\" (1.6 m)    Wt 61.2 kg (135 lb)    SpO2 100%    BMI 23.91 kg/m2     SpO2 Readings from Last 6 Encounters:   18 100%   18 100%   18 96%   16 100%   16 99%   10/26/16 100%          Intake/Output Summary (Last 24 hours) at 18 1356  Last data filed at 18 1102   Gross per 24 hour   Intake              400 ml   Output                0 ml   Net              400 ml        Physical Exam:    Gen:  Well-developed, well-nourished, in no acute distress  HEENT:  Pink conjunctivae, PERRL, hearing intact to voice, moist mucous membranes  Neck:  Supple, without masses, thyroid non-tender  Resp:  No accessory muscle use, clear breath sounds without wheezes rales or rhonchi  Card:  No murmurs, normal S1, S2 without thrills, bruits or peripheral edema  Abd:  Soft, mildly-tender, non-distended, reduced bowel sounds are present, no mass  Lymph:  No cervical or inguinal adenopathy  Musc:  No cyanosis or clubbing  Skin:  No rashes or ulcers, skin turgor is good  Neuro:  Cranial nerves are grossly intact, no focal motor weakness, follows commands appropriately  Psych:  Good insight, oriented to person, place and time, alert    Telemetry reviewed:   normal sinus rhythm  __________________________________________________________________  Medications Reviewed: (see below)  Medications:     Current Facility-Administered Medications   Medication Dose Route Frequency    0.9% sodium chloride infusion  50 mL/hr IntraVENous CONTINUOUS    HYDROmorphone (DILAUDID) syringe 1 mg  1 mg IntraVENous Q3H PRN    morphine IR (MS IR) tablet 15 mg  15 mg Oral Q4H PRN    methylPREDNISolone (PF) (SOLU-MEDROL) injection 60 mg  60 mg IntraVENous Q8H    piperacillin-tazobactam (ZOSYN) 3.375 g in 0.9% sodium chloride (MBP/ADV) 100 mL  3.375 g IntraVENous Q8H    buPROPion SR (WELLBUTRIN SR) tablet 150 mg  150 mg Oral DAILY    dicyclomine (BENTYL) tablet 20 mg  20 mg Oral QID PRN    glycopyrrolate (ROBINUL) tablet 1 mg  1 mg Oral BID    levETIRAcetam (KEPPRA) oral solution 750 mg  750 mg Oral BID    mirabegron ER (MYRBETRIQ) tablet 50 mg  50 mg Oral DAILY    dextrose 5% - 0.45% NaCl with KCl 20 mEq/L infusion  100 mL/hr IntraVENous CONTINUOUS    sodium chloride (NS) flush 5-10 mL  5-10 mL IntraVENous Q8H    sodium chloride (NS) flush 5-10 mL  5-10 mL IntraVENous PRN    acetaminophen (TYLENOL) tablet 650 mg  650 mg Oral Q4H PRN    naloxone (NARCAN) injection 0.4 mg  0.4 mg IntraVENous PRN    diphenhydrAMINE (BENADRYL) injection 12.5 mg  12.5 mg IntraVENous Q4H PRN    ondansetron (ZOFRAN) injection 4 mg  4 mg IntraVENous Q6H PRN        Lab Data Reviewed: (see below)  Lab Review:     Recent Labs      06/21/18   0256  06/20/18   0308  06/19/18   1810   WBC  23.8*  8.2  11.6* HGB  13.0  13.4  13.3   HCT  37.8  38.4  38.4   PLT  269  248  266     Recent Labs      06/21/18   0256  06/20/18   0308  06/19/18   1810   NA  138  134*  138   K  3.7  4.1  3.4*   CL  104  103  105   CO2  27  24  26   GLU  145*  158*  89   BUN  3*  7  8   CREA  0.80  0.96  0.80   CA  8.2*  8.6  8.9   MG  2.2  1.8   --    PHOS   --   3.2   --    ALB   --   3.4*   --    TBILI   --   0.3   --    SGOT   --   18   --    ALT   --   28   --      Lab Results   Component Value Date/Time    Glucose (POC) 248 (H) 10/26/2016 04:03 PM    Glucose (POC) 79 10/26/2016 03:13 PM    Glucose (POC) 97 01/02/2011 11:35 PM    Glucose (POC) 75 01/02/2011 08:58 PM     No results for input(s): PH, PCO2, PO2, HCO3, FIO2 in the last 72 hours. No results for input(s): INR in the last 72 hours. No lab exists for component: INREXT  All Micro Results     Procedure Component Value Units Date/Time    C. DIFFICILE (DNA) [[id number]] Collected:  06/20/18 1934    Order Status:  Completed Specimen:  Stool Updated:  06/21/18 1329     C. difficile (DNA) NEGATIVE          This specimen is negative for toxigenic C difficile by DNA amplification. Repeat testing is not recommended for confirmation, samples received within 7 days of this negative result will be rejected. CULTURE, STOOL [[id number]]  (Abnormal) Collected:  06/20/18 1934    Order Status:  Completed Specimen:  Stool Updated:  06/21/18 1312     Special Requests: NO SPECIAL REQUESTS        Culture result:         NO ROUTINE ENTERIC PATHOGENS ISOLATED INCLUDING SALMONELLA, SHIGELLA, YERSINIA, VIBRIO OR E. COLI 0157:H7 SO FAR              NO COLIFORMS ISOLATED  SO FAR      FEW YEAST  NOTED (A)             I have reviewed notes of prior 24hr.     Other pertinent lab: none    Total time spent with patient: [address] discussed with: Patient, Nursing Staff, Consultant/Specialist and >50% of time spent in counseling and coordination of care    Discussed:  Care Plan and D/C Planning    Prophylaxis:  Lovenox and H2B/PPI    Disposition:  Home w/Family           ___________________________________________________    Attending Physician: Tonia Reese MD

## 2018-06-21 NOTE — PERIOP NOTES
Demerol 12.5 mg given IV as ordered by Dr. Barbosa Service for shivering with relief. Ready for transport back to her room.

## 2018-06-21 NOTE — PROGRESS NOTES
CM Note:  Reason for Admission:   Crohn's regional enteritis                   RRAT Score:   9                  Plan for utilizing home health:   none                       Likelihood of Readmission:  low                         Transition of Care Plan:                    PTA pt was independent with ADL's, was driving and walked unaided. No DME, has never had home health. She lives by herself in a second floor apartment with 15 entry steps. Her emergency contact is her Arturo Grajeda ([phone]), who will drive her home at d/c. Pt has Rx coverage and gets her medications from [facility] on [facility]. Her PCP is Dr. Lucille Conti. No CARLIE. YOVANNY Espinosa    Care Management Interventions  PCP Verified by CM:  Yes  Palliative Care Criteria Met (RRAT>21 & CHF Dx)?: No  Transition of Care Consult (CM Consult): Discharge Planning  MyChart Signup: No  Discharge Durable Medical Equipment: No  Physical Therapy Consult: No  Occupational Therapy Consult: No  Speech Therapy Consult: No  Current Support Network: Lives Alone  Confirm Follow Up Transport: Family  Plan discussed with Pt/Family/Caregiver: Yes  Discharge Location  Discharge Placement: Home

## 2018-06-21 NOTE — PERIOP NOTES
Chase   1975  [id number]    Situation:    Scheduled Procedure: Procedure(s):  COLONOSCOPY  Verbal report received from: Liang Dupree RN  Preoperative diagnosis: crohns disease    Background:    Procedure: Procedure(s):  COLONOSCOPY  Physician performing procedure; Dr. Margie Garcia PO Intake: MN    Pregnancy Test:needed If yes, result: none    Is the patient taking Blood Thinners: NO If yes, list: n/a and last taken n/a  Is the patient diabetic:no               Does the patient have a Pacemaker/Defibrillator in place?: no   Does the patient need antibiotics before/during/after procedure: no   If the patient is having a colon, How much prep was drank? 100%   What were the Colon prep results? Clear liquid  Does the patient have SCD in place:no   Is patient on CONTACT precautions:yes        If yes, what kind of CONTACT precautions: Enteric precautions for potential C Diff    Assessment:  Are the vital signs stable prior to patient coming to ENDO?  yes  Is the patient alert/oriented and able to sign consent for the procedures:yes  How does the patient's abdomen feel prior to coming to ENDO? Will assess in endo  Does the patient have a patient IV in place?  Yes #20 R ac     Recommendation:  Family or Friend present no     Permission to share finding with Family or Friend n/a

## 2018-06-21 NOTE — PROGRESS NOTES
0737  Bedside and Verbal shift change report given to \[facility]\"", RN (oncoming nurse) by Sheila Manjarrez RN (offgoing nurse). Report included the following information SBAR, Kardex, Intake/Output, MAR, Recent Results and Med Rec Status. 0314  Pt began itching and having facial flushing. PRN benadryl given. Dr. Koleen Dubin made aware and stated to continue to monitor and to give next dose of zosyn. Will continue to monitor. 2129  Pt request if she can shower. Dr. Koleen Dubin said ok for patient to shower.

## 2018-06-21 NOTE — ROUTINE PROCESS
Celestino Fountain  1975  [id number]    Situation:  Verbal report received from: Ninoska Reid RN  Procedure: Procedure(s):  COLONOSCOPY  COLON BIOPSY  COLON DILATATION    Background:    Preoperative diagnosis: crohns disease  Postoperative diagnosis: * No post-op diagnosis entered *    :  Dr. Mckeon Notice  Assistant(s): Endoscopy Technician-1: Steve Akers  Endoscopy RN-1: Ninoska Reid RN    Specimens:   ID Type Source Tests Collected by Time Destination   1 : Terminal Ileum Bxs. Jessica Jean MD 6/21/2018 1048 Pathology     H. Pylori  no    Assessment:  Intra-procedure medications     Anesthesia gave intra-procedure sedation and medications, see anesthesia flow sheet yes    Intravenous fluids: NS@ KVO     Vital signs stable     Abdominal assessment: round and soft     Recommendation:  Discharge patient per MD order. Return to floor  Permission to share finding with family or friend yes.

## 2018-06-21 NOTE — PERIOP NOTES
Procedure being performed under MAC; Stacey Marquez CRNA at bedside monitoring patient at [phone]. See anesthesia notes. Endoscope was pre-cleaned at bedside immediately following procedure by Raúl Britt. at 1102. Care of patient assumed from the anesthesia provider at . Patient tolerated procedure well. Abdomen remains soft and non tender post procedure, no complaints or indication of discomfort noted at this time. See anesthesia note. Patient transferred to Endoscopy Recovery and report given to recovery nurse Yadi Martin. recovery room RN. TRANSFER - OUT REPORT:    Verbal report given to Maday Modi (name) on [facility]  being transferred to [address] [place] [address] (unit) for routine progression of care       Report consisted of patients Situation, Background, Assessment and   Recommendations(SBAR). Information from the following report(s) SBAR, Procedure Summary and Recent Results was reviewed with the receiving nurse. Lines:   Peripheral IV 06/19/18 Right Antecubital (Active)   Site Assessment Clean, dry, & intact 6/21/2018 11:18 AM   Phlebitis Assessment 0 6/21/2018 11:18 AM   Infiltration Assessment 0 6/21/2018 11:18 AM   Dressing Status Clean, dry, & intact 6/21/2018 11:18 AM   Dressing Type Tape;Transparent 6/21/2018 11:18 AM   Hub Color/Line Status Pink;Capped 6/21/2018 11:18 AM   Action Taken Open ports on tubing capped 6/21/2018 11:18 AM   Alcohol Cap Used Yes 6/21/2018 11:18 AM        Opportunity for questions and clarification was provided.

## 2018-06-22 LAB
BACTERIA SPEC CULT: ABNORMAL
C JEJUNI+C COLI AG STL QL: NEGATIVE
E COLI SXT1+2 STL IA: NO GROWTH
SERVICE CMNT-IMP: ABNORMAL

## 2018-06-22 PROCEDURE — 74011250636 HC RX REV CODE- 250/636: Performed by: INTERNAL MEDICINE

## 2018-06-22 PROCEDURE — 74011636637 HC RX REV CODE- 636/637: Performed by: INTERNAL MEDICINE

## 2018-06-22 PROCEDURE — 74011250636 HC RX REV CODE- 250/636: Performed by: NURSE PRACTITIONER

## 2018-06-22 PROCEDURE — 74011250637 HC RX REV CODE- 250/637: Performed by: INTERNAL MEDICINE

## 2018-06-22 PROCEDURE — 74011000258 HC RX REV CODE- 258: Performed by: INTERNAL MEDICINE

## 2018-06-22 PROCEDURE — 65270000029 HC RM PRIVATE

## 2018-06-22 RX ORDER — SODIUM,POTASSIUM PHOSPHATES 280-250MG
1 POWDER IN PACKET (EA) ORAL 2 TIMES DAILY
Status: DISCONTINUED | OUTPATIENT
Start: 2018-06-22 | End: 2018-06-22

## 2018-06-22 RX ORDER — LEVOFLOXACIN 750 MG/1
750 TABLET ORAL EVERY 24 HOURS
Status: DISCONTINUED | OUTPATIENT
Start: 2018-06-22 | End: 2018-06-24 | Stop reason: HOSPADM

## 2018-06-22 RX ORDER — PREDNISONE 20 MG/1
40 TABLET ORAL
Status: DISCONTINUED | OUTPATIENT
Start: 2018-06-22 | End: 2018-06-24 | Stop reason: HOSPADM

## 2018-06-22 RX ORDER — METRONIDAZOLE 250 MG/1
500 TABLET ORAL EVERY 12 HOURS
Status: DISCONTINUED | OUTPATIENT
Start: 2018-06-22 | End: 2018-06-24 | Stop reason: HOSPADM

## 2018-06-22 RX ORDER — HYDROMORPHONE HYDROCHLORIDE 2 MG/ML
1 INJECTION, SOLUTION INTRAMUSCULAR; INTRAVENOUS; SUBCUTANEOUS
Status: DISCONTINUED | OUTPATIENT
Start: 2018-06-22 | End: 2018-06-24 | Stop reason: HOSPADM

## 2018-06-22 RX ADMIN — DEXTROSE MONOHYDRATE, SODIUM CHLORIDE, AND POTASSIUM CHLORIDE 100 ML/HR: 50; 4.5; 1.49 INJECTION, SOLUTION INTRAVENOUS at 00:30

## 2018-06-22 RX ADMIN — LEVETIRACETAM 750 MG: 100 SOLUTION ORAL at 18:15

## 2018-06-22 RX ADMIN — METHYLPREDNISOLONE SODIUM SUCCINATE 60 MG: 125 INJECTION, POWDER, FOR SOLUTION INTRAMUSCULAR; INTRAVENOUS at 06:14

## 2018-06-22 RX ADMIN — METRONIDAZOLE 500 MG: 250 TABLET ORAL at 11:52

## 2018-06-22 RX ADMIN — DEXTROSE MONOHYDRATE, SODIUM CHLORIDE, AND POTASSIUM CHLORIDE 100 ML/HR: 50; 4.5; 1.49 INJECTION, SOLUTION INTRAVENOUS at 21:28

## 2018-06-22 RX ADMIN — LEVETIRACETAM 750 MG: 100 SOLUTION ORAL at 09:08

## 2018-06-22 RX ADMIN — LEVOFLOXACIN 750 MG: 750 TABLET, FILM COATED ORAL at 11:51

## 2018-06-22 RX ADMIN — GLYCOPYRROLATE 1 MG: 1 TABLET ORAL at 09:08

## 2018-06-22 RX ADMIN — GLYCOPYRROLATE 1 MG: 1 TABLET ORAL at 18:17

## 2018-06-22 RX ADMIN — PIPERACILLIN SODIUM AND TAZOBACTAM SODIUM 3.38 G: 3; .375 INJECTION, POWDER, LYOPHILIZED, FOR SOLUTION INTRAVENOUS at 03:48

## 2018-06-22 RX ADMIN — PREDNISONE 40 MG: 20 TABLET ORAL at 16:47

## 2018-06-22 RX ADMIN — Medication 10 ML: at 21:23

## 2018-06-22 RX ADMIN — DEXTROSE MONOHYDRATE, SODIUM CHLORIDE, AND POTASSIUM CHLORIDE 100 ML/HR: 50; 4.5; 1.49 INJECTION, SOLUTION INTRAVENOUS at 11:47

## 2018-06-22 RX ADMIN — Medication 10 ML: at 06:14

## 2018-06-22 RX ADMIN — MORPHINE SULFATE 15 MG: 15 TABLET ORAL at 21:28

## 2018-06-22 RX ADMIN — HYDROMORPHONE HYDROCHLORIDE 1 MG: 1 INJECTION, SOLUTION INTRAMUSCULAR; INTRAVENOUS; SUBCUTANEOUS at 00:31

## 2018-06-22 RX ADMIN — METRONIDAZOLE 500 MG: 250 TABLET ORAL at 21:20

## 2018-06-22 RX ADMIN — MIRABEGRON 50 MG: 25 TABLET, FILM COATED, EXTENDED RELEASE ORAL at 09:09

## 2018-06-22 RX ADMIN — MORPHINE SULFATE 15 MG: 15 TABLET ORAL at 11:43

## 2018-06-22 RX ADMIN — BUPROPION HYDROCHLORIDE 150 MG: 150 TABLET, EXTENDED RELEASE ORAL at 09:09

## 2018-06-22 RX ADMIN — MORPHINE SULFATE 15 MG: 15 TABLET ORAL at 16:48

## 2018-06-22 NOTE — ADT AUTH CERT NOTES
ADDITIONAL CLINICAL by Bradford Multani RN        Review Status Review Entered       In Primary 2018       Details         Assessment and Plan:      Crohn's regional enteritis / Acute abd pain / leukocytosis - Distal ileum narrowing on CT.  No evidence of sepsis.  GI consulted and did colonoscopy this AM.  Resume clears, continue IVF, empiric IV Zosyn, IV solumedrol.  Prn IV dilaudid.  Bentyl and glycopyrrolate.  Likely home in 2-3 days     Mild malnutrition / poor PO intake - POA, acute, due to above.  Consult Nutrition     Hx Epilepsy - Continue Keppra     Depression - Continue Wellbutrin     Leukocytosis - Presumed due to steroids.  Abx as above.  Cx if fever.     VITALS: T 97.6, P 91, RR 16, BP 99/54, 97% RA  LABS: WBC 23.8, , CA 8.2  MEDS: D5%-0.45%NACL WITH 20KCL 100ML/HR IV CONT, BENADRYL 12.5MG IV x1, LOVENOX 40MG SC Q24H, ROBINOL PO BID, DILAUDID 1MG IV x4, SOLUMEDROL 60MG IV Q8H, ZOFRAN 4MG IV x1, ZOSYN 3.375G IV Q8H, DEMEROL 12.5MG IV x1                  STOOL CULTURE by Bradford Multani RN        Review Status Review Entered       In Primary 2018       Details                  2018  9:16 AM - [facility], Lab In [facility]       Component Results      Component Value Flag Ref Range Units Status     Special Requests: NO SPECIAL REQUESTS       Final     Campylobacter antigen NEGATIVE       Final     Shiga toxin-producing E. coli Ag NO GROWTH       Final     Culture result:         Final     NO ROUTINE ENTERIC PATHOGENS ISOLATED INCLUDING SALMONELLA, SHIGELLA, YERSINIA, VIBRIO OR E. COLI 0157:H7     Culture result: NO COLIFORMS ISOLATED       Final     Culture result: FEW YEAST  NOTED (A)     Final     Culture result:         Final     FEW   GRAM POSITIVE BUSTER ISOLATED                       COLONOSCOPY REPORT by Bradford Multani RN        Review Status Review Entered       In Primary 2018       Details             Colonoscopy Operative Report  Chase Tatum  :  [id number]  [facility] Medical Record Number:  [id number]        Indications:  History of CD with J-pouch. Noted to have worsening abdominal pain. SBFT showed stricture 4cms proximal to the rectum at the dontae-terminal ileum     : Elina Gilliam MD     Referring Donna Park MD     Sedation: [facility] anesthesia Propofol     Pre-Procedural Exam:     Airway: clear,  No airway problems anticipated  Heart: RRR, without gallops or rubs  Lungs: clear bilaterally without wheezes, crackles, or rhonchi  Abdomen: soft, nontender, nondistended, bowel sounds present  Mental Status: awake, alert and oriented to person, place and time      Procedure Details:  After informed consent was obtained with all risks and benefits of procedure explained and preoperative exam completed, the patient was taken to the endoscopy suite and placed in the left lateral decubitus position.  Upon sequential sedation as per above, a digital rectal exam was performed. The Olympus videocolonoscope  was inserted in the rectum and carefully advanced to the terminal ileum.  The quality of preparation was excellent.  The colonoscope was slowly withdrawn with careful inspection and evaluation between folds. Retroflexion in the rectum was performed.     Findings:   Terminal Ileum: The J-pouch was noted and appeared to be normal. The exam was then directed to the terminal ileum. There was no evidence of active Crohn's disease in the small bowel. The scope was advanced at least 40cms beyond the rectal verge. Biopsies were obtained for histology. There was slight amount of stenosis noted at the anastomosis with mild increase in mucosal erythema. Biopsies were obtained also. Given her symptoms I elected to dilate her anastomosis with a 20mm balloon. There was improvement in the stenosis noted after the dilation. Rectum: normal     Interventions:  Biopsies of the terminal ileum and anastomotic region.                                     Balloon dilation of the anastomosis     Specimen Removed:  As described above     Complications: None.      EBL:  None.     Impression:  The terminal ileum appeared normal. There was slight increase in erythema noted at the anastomosis. There was no evidence of recurrence of CD noted. Biopsies were obtained for histology  Suze Andra was slight narrowing noted at the anastomosis. This corroborates with findings on the SBFT. The stenosis was dilated with a 20mm balloon with good results and noticeable improvement in the narrowing.      Recommendations:  -Await pathology.   -Clear liquid diet and advance as tolerated.    -Resume normal medication(s). -If continues to do well will plan to switch her to oral prednisone and Abx tomorrow and possible discharge soon with short outpatient follow up     Discharge Disposition:  Home in the company of a  when able to ambulate.

## 2018-06-22 NOTE — PROGRESS NOTES
Neo Dempsey willie [place] [address]  [address] [facility], [address] [place] [address]  [phone]      Medical Progress Note      NAME: Jaja Godfrey   :  1975  MRM:  [id number]    Date/Time: 2018  1:56 PM       Assessment and Plan:     Crohn's regional enteritis / Acute abd pain / leukocytosis - Distal ileum narrowing on CT.  No evidence of sepsis.  GI consulted and dilated anastamosis with improvement in narrowing. Resumed diet, advance slowly. Continue IVFs until PO intake reliable. Wean IV zosyn to levofloxacin/flagyl. Wean IV solumedrol to prednisone. Prn IV dilaudid. Bentyl and glycopyrrolate. Home tomorrow if GI okay and tolerates transition to orals. Would need prednisone taper recommendations from GI (or if we should switch to budesonide)      Mild malnutrition / poor PO intake - POA, acute, due to above.  Nutrition consult appreciated      Hx Epilepsy - Continue Keppra      Depression - Continue Wellbutrin    Leukocytosis - Presumed due to steroids. Abx as above. Cx if fever.        Subjective:     Chief Complaint:  Patient seen and examined. Chart reviewed. Patient reports improvement but not complete resolution of pain. Was able to tolerate 50% of meal with no immediate pain increase noted. ROS:  (bold if positive, if negative)    Abd Pain  Tolerating PT   Tolerating some PO        Objective:     Last 24hrs VS reviewed since prior progress note.  Most recent are:    Visit Vitals    /60 (BP 1 Location: Left arm, BP Patient Position: At rest)    Pulse 95    Temp 98.4 °F (36.9 °C)    Resp 20    Ht 5' 3\" (1.6 m)    Wt 61.2 kg (135 lb)    SpO2 97%    BMI 23.91 kg/m2     SpO2 Readings from Last 6 Encounters:   18 97%   18 100%   18 96%   16 100%   16 99%   10/26/16 100%            Intake/Output Summary (Last 24 hours) at 18 1254  Last data filed at 18 1000   Gross per 24 hour   Intake              240 ml   Output                0 ml   Net              240 ml        Physical Exam:    Gen:  Well-developed, well-nourished, in no acute distress  HEENT:  Pink conjunctivae, PERRL, hearing intact to voice, moist mucous membranes  Neck:  Supple, without masses, thyroid non-tender  Resp:  No accessory muscle use, clear breath sounds without wheezes rales or rhonchi  Card:  No murmurs, normal S1, S2 without thrills, bruits or peripheral edema  Abd:  Soft, mildly-tender, non-distended, reduced bowel sounds are present, no mass  Lymph:  No cervical or inguinal adenopathy  Musc:  No cyanosis or clubbing  Skin:  No rashes or ulcers, skin turgor is good  Neuro:  Cranial nerves are grossly intact, no focal motor weakness, follows commands appropriately  Psych:  Good insight, oriented to person, place and time, alert    Telemetry reviewed:   normal sinus rhythm  __________________________________________________________________  Medications Reviewed: (see below)  Medications:     Current Facility-Administered Medications   Medication Dose Route Frequency    levoFLOXacin (LEVAQUIN) tablet 750 mg  750 mg Oral Q24H    metroNIDAZOLE (FLAGYL) tablet 500 mg  500 mg Oral Q12H    predniSONE (DELTASONE) tablet 40 mg  40 mg Oral DAILY WITH DINNER    enoxaparin (LOVENOX) injection 40 mg  40 mg SubCUTAneous Q24H    HYDROmorphone (DILAUDID) syringe 1 mg  1 mg IntraVENous Q3H PRN    morphine IR (MS IR) tablet 15 mg  15 mg Oral Q4H PRN    buPROPion SR (WELLBUTRIN SR) tablet 150 mg  150 mg Oral DAILY    dicyclomine (BENTYL) tablet 20 mg  20 mg Oral QID PRN    glycopyrrolate (ROBINUL) tablet 1 mg  1 mg Oral BID    levETIRAcetam (KEPPRA) oral solution 750 mg  750 mg Oral BID    mirabegron ER (MYRBETRIQ) tablet 50 mg  50 mg Oral DAILY    dextrose 5% - 0.45% NaCl with KCl 20 mEq/L infusion  100 mL/hr IntraVENous CONTINUOUS    sodium chloride (NS) flush 5-10 mL  5-10 mL IntraVENous Q8H    sodium chloride (NS) flush 5-10 mL  5-10 mL IntraVENous PRN    acetaminophen (TYLENOL) tablet 650 mg  650 mg Oral Q4H PRN    naloxone (NARCAN) injection 0.4 mg  0.4 mg IntraVENous PRN    diphenhydrAMINE (BENADRYL) injection 12.5 mg  12.5 mg IntraVENous Q4H PRN    ondansetron (ZOFRAN) injection 4 mg  4 mg IntraVENous Q6H PRN        Lab Data Reviewed: (see below)  Lab Review:     Recent Labs      06/21/18   0256  06/20/18   0308  06/19/18   1810   WBC  23.8*  8.2  11.6*   HGB  13.0  13.4  13.3   HCT  37.8  38.4  38.4   PLT  269  248  266     Recent Labs      06/21/18   0256  06/20/18   0308  06/19/18   1810   NA  138  134*  138   K  3.7  4.1  3.4*   CL  104  103  105   CO2  27  24  26   GLU  145*  158*  89   BUN  3*  7  8   CREA  0.80  0.96  0.80   CA  8.2*  8.6  8.9   MG  2.2  1.8   --    PHOS   --   3.2   --    ALB   --   3.4*   --    TBILI   --   0.3   --    SGOT   --   18   --    ALT   --   28   --      Lab Results   Component Value Date/Time    Glucose (POC) 248 (H) 10/26/2016 04:03 PM    Glucose (POC) 79 10/26/2016 03:13 PM    Glucose (POC) 97 01/02/2011 11:35 PM    Glucose (POC) 75 01/02/2011 08:58 PM     No results for input(s): PH, PCO2, PO2, HCO3, FIO2 in the last 72 hours. No results for input(s): INR in the last 72 hours.     No lab exists for component: INREXT, INREXT  All Micro Results     Procedure Component Value Units Date/Time    CULTURE, STOOL [[id number]]  (Abnormal) Collected:  06/20/18 1934    Order Status:  Completed Specimen:  Stool Updated:  06/22/18 0916     Special Requests: NO SPECIAL REQUESTS        Campylobacter antigen NEGATIVE        Shiga toxin-producing E. coli Ag NO GROWTH        Culture result:         NO ROUTINE ENTERIC PATHOGENS ISOLATED INCLUDING SALMONELLA, SHIGELLA, YERSINIA, VIBRIO OR E. COLI 0157:H7      NO COLIFORMS ISOLATED         FEW YEAST  NOTED (A)         FEW  GRAM POSITIVE BUSTER ISOLATED       C. DIFFICILE (DNA) [[id number]] Collected:  06/20/18 1934    Order Status:  Completed Specimen:  Stool Updated:  06/21/18 1329     C. difficile (DNA) NEGATIVE          This specimen is negative for toxigenic C difficile by DNA amplification. Repeat testing is not recommended for confirmation, samples received within 7 days of this negative result will be rejected. I have reviewed notes of prior 24hr.     Other pertinent lab: none    Total time spent with patient: [address] [place] [address] discussed with: Patient, Nursing Staff, Consultant/Specialist and >50% of time spent in counseling and coordination of care    Discussed:  Care Plan and D/C Planning    Prophylaxis:  Lovenox and H2B/PPI    Disposition:  Home w/Family           ___________________________________________________    Attending Physician: [address],

## 2018-06-22 NOTE — PROGRESS NOTES
Bedside shift change report given to Wili Vital (oncoming nurse) by Omar Hernandez (offgoing nurse). Report included the following information SBAR, Kardex, Intake/Output, MAR and Recent Results.

## 2018-06-22 NOTE — PROGRESS NOTES
[address] NP  [phone]           GI PROGRESS NOTE        NAME: Mickey Hdz   :  1975   MRN:  [id number]       Subjective:   Reports pain is better. She is ready to to advance her diet. Objective:         VITALS:   Last 24hrs VS reviewed since prior progress note. Most recent are:  Visit Vitals    /56 (BP 1 Location: Left arm, BP Patient Position: At rest)    Pulse 85    Temp 99.2 °F (37.3 °C)    Resp 18    Ht 5' 3\" (1.6 m)    Wt 61.2 kg (135 lb)    SpO2 96%    BMI 23.91 kg/m2       Intake/Output Summary (Last 24 hours) at 18 1014  Last data filed at 18 1102   Gross per 24 hour   Intake              400 ml   Output                0 ml   Net              400 ml       PHYSICAL EXAM:  General: Alert, in no acute distress    HEENT: Anicteric sclerae. Lungs:            CTA Bilaterally. Heart:  Regular  rhythm,    Abdomen: Soft, Non distended, Non tender.  (+)Bowel sounds, no HSM  Extremities: No c/c/e  Neurologic:  CN 2-12 gi, Alert and oriented X 3. No acute neurological distress   Psych:   Good insight. Not anxious nor agitated.     Lab Data Reviewed:   Recent Labs      18   0256  18   0308   WBC  23.8*  8.2   HGB  13.0  13.4   HCT  37.8  38.4   PLT  269  248     Recent Labs      18   0256  18   0308   NA  138  134*   K  3.7  4.1   CL  104  103   CO2  27  24   BUN  3*  7   CREA  0.80  0.96   GLU  145*  158*   PHOS   --   3.2   CA  8.2*  8.6     Recent Labs      18   0308   SGOT  18   AP  60   TP  6.6   ALB  3.4*   GLOB  3.2       ________________________________________________________________________  Patient Active Problem List   Diagnosis Code    Ulcerative colitis ([facility] [address] K51.90    Pancreatitis K85.90    Crohn's regional enteritis ([facility] [facility] K50.90    Epilepsy ([facility] G40.909    Depression F32.9         Assessment and Plan:     Abdominal  Pain:  Improved after dilation of stenosis of bowel with colonoscope.     - Advanced to GI Lite diet today. - Discharge on tapering dose of Prednisone -  40mg daily x 1 week, 30mg daily x 1 week, 20mg x 1 week, 10mg x 1 week. - Discharge on 7 days of PO ABX. Started PO abx and prednisone today. Ok to discharge tomorrow      Diarrhea: Acute on Chronic - improving  - C diff negative, few yeast noted on stool culture, fecal calprotectin pending. Started PO abx and prednisone today. Ok to discharge tomorrow.        Signed By: Zac Burch NP     6/22/2018  10:14 AM

## 2018-06-22 NOTE — PROGRESS NOTES
CM Note:  No weekend or d/c needs anticipated. May d/c on w/e depending on how well she is able to eat.   YOVANNY Espinosa

## 2018-06-22 NOTE — PROGRESS NOTES
Nutrition Assessment:    RECOMMENDATIONS/INTERVENTION(S):   Continue GI lite per GI  Monitor PO intakes, Gi status, weight,     ASSESSMENT:   6/22: Follow up. Pt had colonoscopy yesterday ( 6/21), see GI note for details. Had dilation. Pt diet advanced to fulls yesterday, GI lite this morning. Pt ate 50% of breakfast, states no pain or nausea. Possible d/c today per GI.     6/20: 38 yr old female admitted with abdominal pain, increasing over last 1-2 months. Pt with hx of J-pouch - 23 yrs ago 2/2 hx of ulcerative colitis. Pt states she's lost about 5 lbs over last week or so. She has only been tolerating clear liquids at home PTA. Pt usually avoids trigger foods for her (beef, fatty foods, etc.) Pt knowledgeable about foods she can tolerate usually. Discussed probiotics and elimination of some foods. GI NP states pt will be on CLD until Friday when she will have colonoscopy to allow GI tract to calm down. Bg 158 mg/dL. Na 134. SUBJECTIVE/OBJECTIVE:   Diet Order: Clear liquids  % Eaten:    Patient Vitals for the past 168 hrs:   % Diet Eaten   06/22/18 1000 50 %     Pertinent Medications: [x] Reviewed    Past Medical History:   Diagnosis Date    Bipolar 2 disorder ([address]     Colitis, ulcerative chronic ([address]     Crohn disease ([address]     Depression     Epilepsy ([address]     Pancreatitis 9/13/2010    Seizure ([address]     Ulcerative colitis ([address] 9/13/2010        Chemistries:  Lab Results   Component Value Date/Time    Sodium 138 06/21/2018 02:56 AM    Potassium 3.7 06/21/2018 02:56 AM    Chloride 104 06/21/2018 02:56 AM    CO2 27 06/21/2018 02:56 AM    Anion gap 7 06/21/2018 02:56 AM    Glucose 145 (H) 06/21/2018 02:56 AM    BUN 3 (L) 06/21/2018 02:56 AM    Creatinine 0.80 06/21/2018 02:56 AM    BUN/Creatinine ratio 4 (L) 06/21/2018 02:56 AM    GFR est AA >60 06/21/2018 02:56 AM    GFR est non-AA >60 06/21/2018 02:56 AM    Calcium 8.2 (L) 06/21/2018 02:56 AM    AST (SGOT) 18 06/20/2018 03:08 AM    Alk. phosphatase 60 06/20/2018 03:08 AM    Protein, total 6.6 06/20/2018 03:08 AM    Albumin 3.4 (L) 06/20/2018 03:08 AM    Globulin 3.2 06/20/2018 03:08 AM    A-G Ratio 1.1 06/20/2018 03:08 AM    ALT (SGPT) 28 06/20/2018 03:08 AM      Anthropometrics: Height: 5' 3\" (160 cm) Weight: 61.2 kg (135 lb)    IBW (%IBW): 52.2 kg (115 lb) ( ) UBW (%UBW):   (  %)    BMI: Body mass index is 23.91 kg/(m^2). This BMI is indicative of:     [] Underweight    [x] Normal    [] Overweight    []  Obesity    []  Extreme Obesity (BMI>40)    Estimated Nutrition Needs (Based on): 1600 Kcals/day (BMR(1231x1.3)) , 61 g (-73g/day(1.0-1.2g/kg)) Protein  Carbohydrate: At Least 130 g/day  Fluids: 1600 mL/day (1mL/kg rounded to 50 mL)    Last BM: 6/22 - loose (baseline)  [x]Active     []Hyperactive  []Hypoactive       [] Absent   BS  Skin:    [x] Intact   [] Incision  [] Breakdown   [] DTI   [] Tears/Excoriation/Abrasion  []Edema [] Other: Wt Readings from Last 30 Encounters:   06/19/18 61.2 kg (135 lb)   03/23/18 59 kg (130 lb)   01/25/18 65.2 kg (143 lb 11.8 oz)   12/01/16 61.2 kg (135 lb)   11/06/16 61.2 kg (135 lb)   10/26/16 61.2 kg (135 lb)   10/18/16 64.4 kg (142 lb)   04/21/15 65.8 kg (145 lb)   11/08/13 72.6 kg (160 lb)   01/10/13 72.6 kg (160 lb)   11/07/12 72.6 kg (160 lb)   04/03/12 68 kg (150 lb)   07/05/11 45.4 kg (100 lb)   06/17/11 65.8 kg (145 lb)      NUTRITION DIAGNOSES:   Problem:  Altered GI function Inadequate energy intake   Etiology: related to alteration in structure function of GI tract decreased ability to consume sufficient energy   Signs/Symptoms: as evidenced by J-pouch, Crohn's flare, abominal pain NPO, CLD, low intakes x 1-2 weeks.      NUTRITION INTERVENTIONS:  Meals/Snacks: General/healthful diet                  GOAL:   Pt will consume >50% of meals and ONS within 2-4 days    Cultural, Cheondoism, or Ethnic Dietary Needs: None     LEARNING NEEDS (Diet, Food/Nutrient-Drug Interaction):    [x] None Identified   [] Identified and Education Provided/Documented   [] Identified and Pt declined/was not appropriate      [x] Interdisciplinary Care Plan Reviewed/Documented    [x] Discharge Needs:    TBD   [] No Nutrition Related Discharge Needs    NUTRITION RISK:   Pt Is At Nutrition Risk  [x]     No Nutrition Risk Identified  []       PT SEEN FOR:    []  MD Consult: []Calorie Count      []Diabetic Diet Education        []Diet Education     []Electrolyte Management     []General Nutrition Management and Supplements     []Management of Tube Feeding     []TPN Recommendations    []  RN Referral:  []MST score >=2     []Enteral/Parenteral Nutrition PTA     []Pregnant: Gestational DM or Multigestation                 [] Pressure Ulcer      []  Low BMI      []  Length of Stay       [] Dysphagia Diet     [] Ventilator      [x] Follow-Up        Previous Recommendations:   [x] Implemented          [] Not Implemented          [] Not Applicable    Previous Goal:   [] Met              [x] Progressing Towards Goal              [] Not Progressing Towards Goal   [] Not Applicable              Meeta Torres N [address] [place] [address]  Pager: [phone]  Office: [phone]

## 2018-06-23 LAB
ANION GAP SERPL CALC-SCNC: 7 MMOL/L (ref 5–15)
BASOPHILS # BLD: 0 K/UL (ref 0–0.1)
BASOPHILS NFR BLD: 0 % (ref 0–1)
BUN SERPL-MCNC: 6 MG/DL (ref 6–20)
BUN/CREAT SERPL: 9 (ref 12–20)
CALCIUM SERPL-MCNC: 8.5 MG/DL (ref 8.5–10.1)
CHLORIDE SERPL-SCNC: 106 MMOL/L (ref 97–108)
CO2 SERPL-SCNC: 25 MMOL/L (ref 21–32)
CREAT SERPL-MCNC: 0.64 MG/DL (ref 0.55–1.02)
DIFFERENTIAL METHOD BLD: ABNORMAL
EOSINOPHIL # BLD: 0 K/UL (ref 0–0.4)
EOSINOPHIL NFR BLD: 0 % (ref 0–7)
ERYTHROCYTE [DISTWIDTH] IN BLOOD BY AUTOMATED COUNT: 11.4 % (ref 11.5–14.5)
GLUCOSE SERPL-MCNC: 127 MG/DL (ref 65–100)
HCT VFR BLD AUTO: 36.8 % (ref 35–47)
HGB BLD-MCNC: 12.7 G/DL (ref 11.5–16)
IMM GRANULOCYTES # BLD: 0 K/UL (ref 0–0.04)
IMM GRANULOCYTES NFR BLD AUTO: 0 % (ref 0–0.5)
LYMPHOCYTES # BLD: 1.6 K/UL (ref 0.8–3.5)
LYMPHOCYTES NFR BLD: 15 % (ref 12–49)
MAGNESIUM SERPL-MCNC: 1.9 MG/DL (ref 1.6–2.4)
MCH RBC QN AUTO: 32.4 PG (ref 26–34)
MCHC RBC AUTO-ENTMCNC: 34.5 G/DL (ref 30–36.5)
MCV RBC AUTO: 93.9 FL (ref 80–99)
MONOCYTES # BLD: 0.7 K/UL (ref 0–1)
MONOCYTES NFR BLD: 6 % (ref 5–13)
NEUTS SEG # BLD: 8.5 K/UL (ref 1.8–8)
NEUTS SEG NFR BLD: 79 % (ref 32–75)
NRBC # BLD: 0 K/UL (ref 0–0.01)
NRBC BLD-RTO: 0 PER 100 WBC
PHOSPHATE SERPL-MCNC: 2.5 MG/DL (ref 2.6–4.7)
PLATELET # BLD AUTO: 255 K/UL (ref 150–400)
PMV BLD AUTO: 9.4 FL (ref 8.9–12.9)
POTASSIUM SERPL-SCNC: 4.2 MMOL/L (ref 3.5–5.1)
RBC # BLD AUTO: 3.92 M/UL (ref 3.8–5.2)
SODIUM SERPL-SCNC: 138 MMOL/L (ref 136–145)
WBC # BLD AUTO: 10.8 K/UL (ref 3.6–11)

## 2018-06-23 PROCEDURE — 83735 ASSAY OF MAGNESIUM: CPT | Performed by: INTERNAL MEDICINE

## 2018-06-23 PROCEDURE — 74011250637 HC RX REV CODE- 250/637: Performed by: INTERNAL MEDICINE

## 2018-06-23 PROCEDURE — 36415 COLL VENOUS BLD VENIPUNCTURE: CPT | Performed by: INTERNAL MEDICINE

## 2018-06-23 PROCEDURE — 85025 COMPLETE CBC W/AUTO DIFF WBC: CPT | Performed by: INTERNAL MEDICINE

## 2018-06-23 PROCEDURE — 80048 BASIC METABOLIC PNL TOTAL CA: CPT | Performed by: INTERNAL MEDICINE

## 2018-06-23 PROCEDURE — 74011636637 HC RX REV CODE- 636/637: Performed by: INTERNAL MEDICINE

## 2018-06-23 PROCEDURE — 74011250636 HC RX REV CODE- 250/636: Performed by: INTERNAL MEDICINE

## 2018-06-23 PROCEDURE — 84100 ASSAY OF PHOSPHORUS: CPT | Performed by: INTERNAL MEDICINE

## 2018-06-23 PROCEDURE — 65270000029 HC RM PRIVATE

## 2018-06-23 RX ORDER — LEVOFLOXACIN 750 MG/1
750 TABLET ORAL EVERY 24 HOURS
Qty: 5 TAB | Refills: 0 | Status: SHIPPED | OUTPATIENT
Start: 2018-06-23 | End: 2018-06-28

## 2018-06-23 RX ORDER — MORPHINE SULFATE 15 MG/1
15 TABLET ORAL
Qty: 12 TAB | Refills: 0 | Status: SHIPPED | OUTPATIENT
Start: 2018-06-23

## 2018-06-23 RX ORDER — PREDNISONE 20 MG/1
TABLET ORAL
Qty: 10 TAB | Refills: 0 | Status: SHIPPED | OUTPATIENT
Start: 2018-06-23

## 2018-06-23 RX ORDER — METRONIDAZOLE 500 MG/1
500 TABLET ORAL EVERY 12 HOURS
Qty: 10 TAB | Refills: 0 | Status: SHIPPED | OUTPATIENT
Start: 2018-06-23 | End: 2018-06-28

## 2018-06-23 RX ADMIN — ENOXAPARIN SODIUM 40 MG: 40 INJECTION SUBCUTANEOUS at 20:28

## 2018-06-23 RX ADMIN — LEVOFLOXACIN 750 MG: 750 TABLET, FILM COATED ORAL at 12:36

## 2018-06-23 RX ADMIN — METRONIDAZOLE 500 MG: 250 TABLET ORAL at 20:26

## 2018-06-23 RX ADMIN — METRONIDAZOLE 500 MG: 250 TABLET ORAL at 08:27

## 2018-06-23 RX ADMIN — MORPHINE SULFATE 15 MG: 15 TABLET ORAL at 08:27

## 2018-06-23 RX ADMIN — MIRABEGRON 50 MG: 25 TABLET, FILM COATED, EXTENDED RELEASE ORAL at 08:27

## 2018-06-23 RX ADMIN — Medication 10 ML: at 22:00

## 2018-06-23 RX ADMIN — BUPROPION HYDROCHLORIDE 150 MG: 150 TABLET, EXTENDED RELEASE ORAL at 08:27

## 2018-06-23 RX ADMIN — MORPHINE SULFATE 15 MG: 15 TABLET ORAL at 12:36

## 2018-06-23 RX ADMIN — GLYCOPYRROLATE 1 MG: 1 TABLET ORAL at 08:27

## 2018-06-23 RX ADMIN — GLYCOPYRROLATE 1 MG: 1 TABLET ORAL at 17:36

## 2018-06-23 RX ADMIN — MORPHINE SULFATE 15 MG: 15 TABLET ORAL at 20:26

## 2018-06-23 RX ADMIN — LEVETIRACETAM 750 MG: 100 SOLUTION ORAL at 08:26

## 2018-06-23 RX ADMIN — Medication 10 ML: at 14:00

## 2018-06-23 RX ADMIN — PREDNISONE 40 MG: 20 TABLET ORAL at 17:36

## 2018-06-23 RX ADMIN — MORPHINE SULFATE 15 MG: 15 TABLET ORAL at 03:40

## 2018-06-23 RX ADMIN — LEVETIRACETAM 750 MG: 100 SOLUTION ORAL at 17:35

## 2018-06-23 NOTE — DISCHARGE INSTRUCTIONS
Patient Discharge Instructions    Clinton Kyleigh / [id number] : 1975    Admitted 2018 Discharged: 2018     Primary Diagnoses  Problem List as of 2018  Date Reviewed: 2018          Codes Class Noted - Resolved    * (Principal)Crohn's regional enteritis [facility]) ICD-10-CM: K50.90  ICD-9-CM: 555.9  2018 - Present        Epilepsy ([facility] (Chronic) ICD-10-CM: G40.909  ICD-9-CM: 345.90  2018 - Present        Depression (Chronic) ICD-10-CM: F32.9  ICD-9-CM: 087  2018 - Present        Ulcerative colitis ([facility] ICD-10-CM: K51.90  ICD-9-CM: 556.9  2010 - Present        Pancreatitis ICD-10-CM: K85.90  ICD-9-CM: 536.3  2010 - Present              Take Home Medications         · It is important that you take the medication exactly as they are prescribed. · Keep your medication in the bottles provided by the pharmacist and keep a list of the medication names, dosages, and times to be taken in your wallet. · Do not take other medications without consulting your doctor. What to do at Home    Recommended diet: Resume previous diet after 2 days of bland/soft diet post-discharge    Recommended activity: Activity as tolerated    If you experience worsening symptoms, please follow up with nearest ER. Follow-up with your PCP in 1 week        Information obtained by :  I understand that if any problems occur once I am at home I am to contact my physician. I understand and acknowledge receipt of the instructions indicated above.                                                                                                                                            Physician's or R.N.'s Signature                                                                  Date/Time                                                                                                                                              Patient or Representative Signature Date/Time     Crohn's Disease: Care Instructions  Your Care Instructions    Crohn's disease is a lifelong inflammatory bowel disease (IBD). Parts of the digestive tract get swollen and irritated and may develop deep sores called ulcers. Crohn's disease usually occurs in the last part of the small intestine and the first part of the large intestine. But it can develop anywhere from the mouth to the anus. The main symptoms of Crohn's disease are belly pain, diarrhea, fever, and weight loss. Some people may have constipation. Crohn's disease also sometimes causes problems with the joints, eyes, or skin. Your symptoms may be mild at some times and severe at others. The disease can also go into remission, which means that it is not active and you have no symptoms. Bad attacks of Crohn's disease often have to be treated in the hospital so that you can get medicines and liquids through a tube in your vein, called an IV. This gives your digestive system time to rest and recover. Talk with your doctor about the best treatments for you. You may need medicines that help prevent or treat flare-ups of the disease. You may need surgery to remove part of your bowel if you have an abnormal opening in the bowel (fistula), an abscess, or a bowel obstruction. In some cases, surgery is needed if medicines do not work. But symptoms often return to other areas of the intestines after surgery. Learning good self-care can help you reduce your symptoms and manage Crohn's disease. Follow-up care is a key part of your treatment and safety. Be sure to make and go to all appointments, and call your doctor if you are having problems. It's also a good idea to know your test results and keep a list of the medicines you take. How can you care for yourself at home? · Take your medicines exactly as prescribed. Call your doctor if you think you are having a problem with your medicine.  You will get more details on the specific medicines your doctor prescribes. · Do not take anti-inflammatory medicines, such as aspirin, ibuprofen (Advil, Motrin), or naproxen (Aleve). They may make your symptoms worse. Do not take any other medicines or herbal products without talking to your doctor first.  · Avoid foods that make your symptoms worse. These might include milk, alcohol, high-fiber foods, or spicy foods. · Eat a healthy diet. Make sure to get enough iron. Rectal bleeding may make you lose iron. Good sources of iron include beef, lentils, spinach, raisins, and iron-enriched breads and cereals. · Drink liquid meal replacements if your doctor recommends them. These are high in calories and contain vitamins and minerals. Severe symptoms may make it hard for your body to absorb vitamins and minerals. · Do not smoke. Smoking makes Crohn's disease worse. If you need help quitting, talk to your doctor about stop-smoking programs and medicines. These can increase your chances of quitting for good. · Seek support from friends and family to help cope with Crohn's disease. The illness can affect all parts of your life. Get counseling if you need it. When should you call for help? Call 911 anytime you think you may need emergency care. For example, call if:  ? · Your stools are maroon or very bloody. ? · You passed out (lost consciousness). ?Call your doctor now or seek immediate medical care if:  ? · You are vomiting. ? · You have new or worse belly pain. ? · You have a fever. ? · You cannot pass stools or gas. ? · You have new or more blood in your stools. ? Watch closely for changes in your health, and be sure to contact your doctor if:  ? · You have new or worse symptoms. ? · You are losing weight. ? · You do not get better as expected. Where can you learn more? Go to http://shayan-jarek.info/. Enter 21 [phone] in the search box to learn more about \"Crohn's Disease: Care Instructions. \"  Current as of: May 12, 2017  Content Version: 11.4  © 5284-4461 [employer]. Care instructions adapted under license by [employer] (which disclaims liability or warranty for this information). If you have questions about a medical condition or this instruction, always ask your healthcare professional. [address] any warranty or liability for your use of this information.

## 2018-06-23 NOTE — ROUTINE PROCESS
Bedside and Verbal shift change report given to Higinio Bell (oncoming nurse) by Sonja Conner (offgoing nurse). Report included the following information SBAR, Kardex, MAR, Accordion and Recent Results.

## 2018-06-23 NOTE — ANESTHESIA POSTPROCEDURE EVALUATION
Post-Anesthesia Evaluation and Assessment    Patient: Imelda Martinez MRN: [id number]  SSN: [id number]    YOB: 1975  Age: 38 y.o. Sex: female       Cardiovascular Function/Vital Signs  Visit Vitals    /78 (BP 1 Location: Left arm, BP Patient Position: At rest)    Pulse 78    Temp 37.3 °C (99.1 °F)    Resp 19    Ht 5' 3\" (1.6 m)    Wt 61.2 kg (135 lb)    SpO2 97%    BMI 23.91 kg/m2       Patient is status post MAC anesthesia for Procedure(s):  COLONOSCOPY  COLON BIOPSY  COLON DILATATION. Nausea/Vomiting: None    Postoperative hydration reviewed and adequate. Pain:  Pain Scale 1: Numeric (0 - 10) (06/22/18 1100)  Pain Intensity 1: 0 (06/22/18 1100)   Managed    Neurological Status: At baseline    Mental Status and Level of Consciousness: Arousable    Pulmonary Status:   O2 Device: Room air (06/22/18 0308)   Adequate oxygenation and airway patent    Complications related to anesthesia: None    Post-anesthesia assessment completed.  No concerns    Signed By: Diana Jalloh MD     June 22, 2018

## 2018-06-23 NOTE — PROGRESS NOTES
Bedside shift change report given to [address] (oncoming nurse) by Reyes Osman (offgoing nurse). Report included the following information SBAR, Kardex and Recent Results.

## 2018-06-23 NOTE — PROGRESS NOTES
[facility]  [address] [facility], [address] [place] [address]  [phone]      Medical Progress Note      NAME: Steven Peres   :  1975  MRM:  [id number]    Date/Time: 2018  1:56 PM       Assessment and Plan:     Crohn's regional enteritis / Acute abd pain / leukocytosis - Distal ileum narrowing on CT.  No evidence of sepsis.  GI consulted and dilated anastamosis with improvement in narrowing. Resumed diet, advance slowly. Continue IVFs until PO intake reliable. Weaned IV zosyn to levofloxacin/flagyl. Weaned IV solumedrol to prednisone. Prn IV dilaudid. Bentyl and glycopyrrolate. Home when pain improves further (still significant post-prandial pain). Will dc on short steroid taper and oral abx course.      Mild malnutrition / poor PO intake - POA, acute, due to above.  Nutrition consult appreciated      Hx Epilepsy - Continue Keppra      Depression - Continue Wellbutrin    Leukocytosis - Presumed due to steroids. Abx as above. Cx if fever.        Subjective:     Chief Complaint:  Patient seen and examined. Chart reviewed. Patient reports some improvement but had post-prandial abd pain following dinner that was concerning to patient. Continues to have 10-12 BMs daily which is baseline. ROS:  (bold if positive, if negative)    Abd Pain  Tolerating PT   Tolerating some PO        Objective:     Last 24hrs VS reviewed since prior progress note.  Most recent are:    Visit Vitals    /80 (BP 1 Location: Left arm, BP Patient Position: At rest)    Pulse 86    Temp 98.4 °F (36.9 °C)    Resp 18    Ht 5' 3\" (1.6 m)    Wt 61.2 kg (135 lb)    SpO2 97%    BMI 23.91 kg/m2     SpO2 Readings from Last 6 Encounters:   18 97%   18 100%   18 96%   16 100%   16 99%   10/26/16 100%            Intake/Output Summary (Last 24 hours) at 18 1223  Last data filed at 18 0820   Gross per 24 hour   Intake              240 ml   Output 0 ml   Net              240 ml        Physical Exam:    Gen:  Well-developed, well-nourished, in no acute distress  HEENT:  Pink conjunctivae, PERRL, hearing intact to voice, moist mucous membranes  Neck:  Supple, without masses, thyroid non-tender  Resp:  No accessory muscle use, clear breath sounds without wheezes rales or rhonchi  Card:  No murmurs, normal S1, S2 without thrills, bruits or peripheral edema  Abd:  Soft, mildly-tender, non-distended, reduced bowel sounds are present, no mass  Lymph:  No cervical or inguinal adenopathy  Musc:  No cyanosis or clubbing  Skin:  No rashes or ulcers, skin turgor is good  Neuro:  Cranial nerves are grossly intact, no focal motor weakness, follows commands appropriately  Psych:  Good insight, oriented to person, place and time, alert    Telemetry reviewed:   normal sinus rhythm  __________________________________________________________________  Medications Reviewed: (see below)  Medications:     Current Facility-Administered Medications   Medication Dose Route Frequency    levoFLOXacin (LEVAQUIN) tablet 750 mg  750 mg Oral Q24H    metroNIDAZOLE (FLAGYL) tablet 500 mg  500 mg Oral Q12H    predniSONE (DELTASONE) tablet 40 mg  40 mg Oral DAILY WITH DINNER    HYDROmorphone (PF) (DILAUDID) injection 1 mg  1 mg IntraVENous Q3H PRN    enoxaparin (LOVENOX) injection 40 mg  40 mg SubCUTAneous Q24H    morphine IR (MS IR) tablet 15 mg  15 mg Oral Q4H PRN    buPROPion SR (WELLBUTRIN SR) tablet 150 mg  150 mg Oral DAILY    dicyclomine (BENTYL) tablet 20 mg  20 mg Oral QID PRN    glycopyrrolate (ROBINUL) tablet 1 mg  1 mg Oral BID    levETIRAcetam (KEPPRA) oral solution 750 mg  750 mg Oral BID    mirabegron ER (MYRBETRIQ) tablet 50 mg  50 mg Oral DAILY    sodium chloride (NS) flush 5-10 mL  5-10 mL IntraVENous Q8H    sodium chloride (NS) flush 5-10 mL  5-10 mL IntraVENous PRN    acetaminophen (TYLENOL) tablet 650 mg  650 mg Oral Q4H PRN    naloxone (NARCAN) injection 0.4 mg  0.4 mg IntraVENous PRN    diphenhydrAMINE (BENADRYL) injection 12.5 mg  12.5 mg IntraVENous Q4H PRN    ondansetron (ZOFRAN) injection 4 mg  4 mg IntraVENous Q6H PRN        Lab Data Reviewed: (see below)  Lab Review:     Recent Labs      06/23/18 0347  06/21/18   0256   WBC  10.8  23.8*   HGB  12.7  13.0   HCT  36.8  37.8   PLT  255  269     Recent Labs      06/23/18   0347  06/21/18   0256   NA  138  138   K  4.2  3.7   CL  106  104   CO2  25  27   GLU  127*  145*   BUN  6  3*   CREA  0.64  0.80   CA  8.5  8.2*   MG  1.9  2.2   PHOS  2.5*   --      Lab Results   Component Value Date/Time    Glucose (POC) 248 (H) 10/26/2016 04:03 PM    Glucose (POC) 79 10/26/2016 03:13 PM    Glucose (POC) 97 01/02/2011 11:35 PM    Glucose (POC) 75 01/02/2011 08:58 PM     No results for input(s): PH, PCO2, PO2, HCO3, FIO2 in the last 72 hours. No results for input(s): INR in the last 72 hours. No lab exists for component: INREXT, INREXT  All Micro Results     Procedure Component Value Units Date/Time    CULTURE, STOOL [[id number]]  (Abnormal) Collected:  06/20/18 1934    Order Status:  Completed Specimen:  Stool Updated:  06/22/18 0916     Special Requests: NO SPECIAL REQUESTS        Campylobacter antigen NEGATIVE        Shiga toxin-producing E. coli Ag NO GROWTH        Culture result:         NO ROUTINE ENTERIC PATHOGENS ISOLATED INCLUDING SALMONELLA, SHIGELLA, YERSINIA, VIBRIO OR E. COLI 0157:H7      NO COLIFORMS ISOLATED         FEW YEAST  NOTED (A)         FEW  GRAM POSITIVE BUSTER ISOLATED       C. DIFFICILE (DNA) [[id number]] Collected:  06/20/18 1934    Order Status:  Completed Specimen:  Stool Updated:  06/21/18 1329     C. difficile (DNA) NEGATIVE          This specimen is negative for toxigenic C difficile by DNA amplification. Repeat testing is not recommended for confirmation, samples received within 7 days of this negative result will be rejected. I have reviewed notes of prior 24hr.     Other pertinent lab: none    Total time spent with patient: 23 [phone] [address]ully discussed with: Patient, Nursing Staff, Consultant/Specialist and >50% of time spent in counseling and coordination of care    Discussed:  Care Plan and D/C Planning    Prophylaxis:  Lovenox and H2B/PPI    Disposition:  Home w/Family           ___________________________________________________    Attending Physician: Ministerio Wolff DO

## 2018-06-23 NOTE — PROGRESS NOTES
Bedside and Verbal shift change report given to Sara Crawford rn  (oncoming nurse) by Pierre Fleischer  (offgoing nurse). Report included the following information SBAR and Kardex.

## 2018-06-24 VITALS
OXYGEN SATURATION: 98 % | WEIGHT: 135 LBS | SYSTOLIC BLOOD PRESSURE: 121 MMHG | DIASTOLIC BLOOD PRESSURE: 68 MMHG | RESPIRATION RATE: 18 BRPM | HEART RATE: 86 BPM | TEMPERATURE: 98.3 F | BODY MASS INDEX: 23.92 KG/M2 | HEIGHT: 63 IN

## 2018-06-24 LAB
ANION GAP SERPL CALC-SCNC: 9 MMOL/L (ref 5–15)
BASOPHILS # BLD: 0 K/UL (ref 0–0.1)
BASOPHILS NFR BLD: 0 % (ref 0–1)
BUN SERPL-MCNC: 11 MG/DL (ref 6–20)
BUN/CREAT SERPL: 15 (ref 12–20)
CALCIUM SERPL-MCNC: 9.2 MG/DL (ref 8.5–10.1)
CHLORIDE SERPL-SCNC: 101 MMOL/L (ref 97–108)
CO2 SERPL-SCNC: 25 MMOL/L (ref 21–32)
CREAT SERPL-MCNC: 0.73 MG/DL (ref 0.55–1.02)
DIFFERENTIAL METHOD BLD: ABNORMAL
EOSINOPHIL # BLD: 0 K/UL (ref 0–0.4)
EOSINOPHIL NFR BLD: 0 % (ref 0–7)
ERYTHROCYTE [DISTWIDTH] IN BLOOD BY AUTOMATED COUNT: 11.2 % (ref 11.5–14.5)
GLUCOSE SERPL-MCNC: 113 MG/DL (ref 65–100)
HCT VFR BLD AUTO: 39.3 % (ref 35–47)
HGB BLD-MCNC: 13.8 G/DL (ref 11.5–16)
IMM GRANULOCYTES # BLD: 0.1 K/UL (ref 0–0.04)
IMM GRANULOCYTES NFR BLD AUTO: 1 % (ref 0–0.5)
LYMPHOCYTES # BLD: 1.4 K/UL (ref 0.8–3.5)
LYMPHOCYTES NFR BLD: 13 % (ref 12–49)
MAGNESIUM SERPL-MCNC: 1.9 MG/DL (ref 1.6–2.4)
MCH RBC QN AUTO: 32.2 PG (ref 26–34)
MCHC RBC AUTO-ENTMCNC: 35.1 G/DL (ref 30–36.5)
MCV RBC AUTO: 91.8 FL (ref 80–99)
MONOCYTES # BLD: 0.5 K/UL (ref 0–1)
MONOCYTES NFR BLD: 5 % (ref 5–13)
NEUTS SEG # BLD: 9.2 K/UL (ref 1.8–8)
NEUTS SEG NFR BLD: 82 % (ref 32–75)
NRBC # BLD: 0 K/UL (ref 0–0.01)
NRBC BLD-RTO: 0 PER 100 WBC
PHOSPHATE SERPL-MCNC: 4.4 MG/DL (ref 2.6–4.7)
PLATELET # BLD AUTO: 283 K/UL (ref 150–400)
PMV BLD AUTO: 9 FL (ref 8.9–12.9)
POTASSIUM SERPL-SCNC: 4.1 MMOL/L (ref 3.5–5.1)
RBC # BLD AUTO: 4.28 M/UL (ref 3.8–5.2)
SODIUM SERPL-SCNC: 135 MMOL/L (ref 136–145)
WBC # BLD AUTO: 11.1 K/UL (ref 3.6–11)

## 2018-06-24 PROCEDURE — 80048 BASIC METABOLIC PNL TOTAL CA: CPT | Performed by: INTERNAL MEDICINE

## 2018-06-24 PROCEDURE — 74011250637 HC RX REV CODE- 250/637: Performed by: INTERNAL MEDICINE

## 2018-06-24 PROCEDURE — 36415 COLL VENOUS BLD VENIPUNCTURE: CPT | Performed by: INTERNAL MEDICINE

## 2018-06-24 PROCEDURE — 84100 ASSAY OF PHOSPHORUS: CPT | Performed by: INTERNAL MEDICINE

## 2018-06-24 PROCEDURE — 83735 ASSAY OF MAGNESIUM: CPT | Performed by: INTERNAL MEDICINE

## 2018-06-24 PROCEDURE — 85025 COMPLETE CBC W/AUTO DIFF WBC: CPT | Performed by: INTERNAL MEDICINE

## 2018-06-24 RX ADMIN — LEVETIRACETAM 750 MG: 100 SOLUTION ORAL at 09:57

## 2018-06-24 RX ADMIN — BUPROPION HYDROCHLORIDE 150 MG: 150 TABLET, EXTENDED RELEASE ORAL at 09:58

## 2018-06-24 RX ADMIN — MIRABEGRON 50 MG: 25 TABLET, FILM COATED, EXTENDED RELEASE ORAL at 09:58

## 2018-06-24 RX ADMIN — METRONIDAZOLE 500 MG: 250 TABLET ORAL at 09:58

## 2018-06-24 RX ADMIN — GLYCOPYRROLATE 1 MG: 1 TABLET ORAL at 09:58

## 2018-06-24 NOTE — ROUTINE PROCESS
Discharge instructions reviewed with family patient discharged, follow up informations and scripts provided

## 2018-06-24 NOTE — DISCHARGE SUMMARY
Physician Discharge Summary     Patient ID:  Regis Santos  [id number]  89 y.o.  1975    Admit date: 6/19/2018    Discharge date and time: 6/24/2018    Admission Diagnoses: Crohn's regional enteritis [facility])  crohns disease    Discharge Diagnoses:    Principal Diagnosis   Crohn's regional enteritis [facility])                                             Other Diagnoses  Principal Problem:    Crohn's regional enteritis ([facility] (6/19/2018)    Active Problems:    Epilepsy ([facility] (6/19/2018)      Depression (6/19/2018)         Hospital Course:     Crohn's regional enteritis / Acute abd pain / leukocytosis - Distal ileum narrowing on CT.  No evidence of sepsis.  GI consulted and dilated anastamosis with improvement in narrowing. Resumed diet, advance slowly. Continue IVFs until PO intake reliable. Weaned IV zosyn to levofloxacin/flagyl. Weaned IV solumedrol to prednisone.  Prn IV dilaudid.  Bentyl and glycopyrrolate. Home when pain improves further (still significant post-prandial pain). Will dc on short steroid taper and oral abx course.      Mild malnutrition / poor PO intake - POA, acute, due to above.  Nutrition consult appreciated      Hx Epilepsy - Continue Keppra      Depression - Continue Wellbutrin     Leukocytosis - Presumed due to steroids. Abx as above. Cx if fever. PCP: Tyra Lowe MD    Consults: GI    Significant Diagnostic Studies: See Hospital Course    Discharged home in improved condition.     Discharge Exam:    Visit Vitals    /68    Pulse 86    Temp 98.3 °F (36.8 °C)    Resp 18    Ht 5' 3\" (1.6 m)    Wt 61.2 kg (135 lb)    SpO2 98%    BMI 23.91 kg/m2       Physical Exam:    Gen: Well-developed, well-nourished, in no acute distress  HEENT:  Pink conjunctivae, hearing intact to voice, moist mucous membranes  Neck: Supple  Resp: No accessory muscle use, clear breath sounds without wheezes rales or rhonchi  Card: No murmurs, normal S1, S2 without thrills or peripheral edema  Abd: Soft, non-tender, non-distended, normoactive bowel sounds are present  Musc: No cyanosis  Skin: No rashes  Neuro: Face symmetric, tongue midline, speech fluent,  strength is 5/5 bilaterally, hip flexion is 5/5 bilaterally, follows commands appropriately  Psych:  Good insight, oriented to person, place and time, alert        Disposition: home    Patient Instructions:   Cannot display discharge medications since this patient is not currently admitted.     Activity: Activity as tolerated  Diet: Resume previous diet  Wound Care: None needed    Follow-up with PCP and GI as directed    Signed:  Salima Alcantar DO  6/24/2018  12:22 PM    Greater than 30 min was spent in coordination, counseling, and execution of this patient's discharge

## 2018-06-24 NOTE — ROUTINE PROCESS
Bedside and Verbal shift change report given to Ramiro Palencia (oncoming nurse) by Rosaline Kolb (offgoing nurse). Report included the following information SBAR, Kardex, Accordion and Recent Results.

## 2018-06-26 LAB — CALPROTECTIN STL-MCNT: 17 UG/G (ref 0–120)

## 2018-07-03 ENCOUNTER — ANESTHESIA (OUTPATIENT)
Dept: ENDOSCOPY | Age: 43
End: 2018-07-03
Payer: MEDICARE

## 2018-07-03 ENCOUNTER — HOSPITAL ENCOUNTER (OUTPATIENT)
Age: 43
Setting detail: OUTPATIENT SURGERY
Discharge: HOME | End: 2018-07-03
Attending: INTERNAL MEDICINE | Admitting: INTERNAL MEDICINE
Payer: MEDICARE

## 2018-07-03 ENCOUNTER — ANESTHESIA EVENT (OUTPATIENT)
Dept: ENDOSCOPY | Age: 43
End: 2018-07-03
Payer: MEDICARE

## 2018-07-03 VITALS
RESPIRATION RATE: 17 BRPM | HEART RATE: 87 BPM | HEIGHT: 63 IN | OXYGEN SATURATION: 99 % | TEMPERATURE: 98 F | SYSTOLIC BLOOD PRESSURE: 109 MMHG | BODY MASS INDEX: 23.92 KG/M2 | WEIGHT: 135 LBS | DIASTOLIC BLOOD PRESSURE: 67 MMHG

## 2018-07-03 LAB — HCG UR QL: NEGATIVE

## 2018-07-03 PROCEDURE — 77030009426 HC FCPS BIOP ENDOSC BSC -B: Performed by: INTERNAL MEDICINE

## 2018-07-03 PROCEDURE — 74011250636 HC RX REV CODE- 250/636: Performed by: INTERNAL MEDICINE

## 2018-07-03 PROCEDURE — 76040000019: Performed by: INTERNAL MEDICINE

## 2018-07-03 PROCEDURE — 74011250636 HC RX REV CODE- 250/636

## 2018-07-03 PROCEDURE — 81025 URINE PREGNANCY TEST: CPT

## 2018-07-03 PROCEDURE — 88305 TISSUE EXAM BY PATHOLOGIST: CPT | Performed by: INTERNAL MEDICINE

## 2018-07-03 PROCEDURE — 76060000031 HC ANESTHESIA FIRST 0.5 HR: Performed by: INTERNAL MEDICINE

## 2018-07-03 RX ORDER — NALOXONE HYDROCHLORIDE 0.4 MG/ML
0.4 INJECTION, SOLUTION INTRAMUSCULAR; INTRAVENOUS; SUBCUTANEOUS
Status: DISCONTINUED | OUTPATIENT
Start: 2018-07-03 | End: 2018-07-03 | Stop reason: HOSPADM

## 2018-07-03 RX ORDER — PROPOFOL 10 MG/ML
INJECTION, EMULSION INTRAVENOUS AS NEEDED
Status: DISCONTINUED | OUTPATIENT
Start: 2018-07-03 | End: 2018-07-03 | Stop reason: HOSPADM

## 2018-07-03 RX ORDER — AMITRIPTYLINE HYDROCHLORIDE 10 MG/1
10 TABLET, FILM COATED ORAL
Qty: 30 TAB | Refills: 3 | Status: SHIPPED | OUTPATIENT
Start: 2018-07-03

## 2018-07-03 RX ORDER — PROPOFOL 10 MG/ML
INJECTION, EMULSION INTRAVENOUS
Status: DISCONTINUED | OUTPATIENT
Start: 2018-07-03 | End: 2018-07-03 | Stop reason: HOSPADM

## 2018-07-03 RX ORDER — SODIUM CHLORIDE 9 MG/ML
INJECTION, SOLUTION INTRAVENOUS
Status: DISCONTINUED | OUTPATIENT
Start: 2018-07-03 | End: 2018-07-03 | Stop reason: HOSPADM

## 2018-07-03 RX ORDER — ATROPINE SULFATE 0.1 MG/ML
0.4 INJECTION INTRAVENOUS
Status: DISCONTINUED | OUTPATIENT
Start: 2018-07-03 | End: 2018-07-03 | Stop reason: HOSPADM

## 2018-07-03 RX ORDER — EPINEPHRINE 0.1 MG/ML
1 INJECTION INTRACARDIAC; INTRAVENOUS
Status: DISCONTINUED | OUTPATIENT
Start: 2018-07-03 | End: 2018-07-03 | Stop reason: HOSPADM

## 2018-07-03 RX ORDER — MIDAZOLAM HYDROCHLORIDE 1 MG/ML
.25-5 INJECTION, SOLUTION INTRAMUSCULAR; INTRAVENOUS
Status: DISCONTINUED | OUTPATIENT
Start: 2018-07-03 | End: 2018-07-03 | Stop reason: HOSPADM

## 2018-07-03 RX ORDER — SODIUM CHLORIDE 9 MG/ML
50 INJECTION, SOLUTION INTRAVENOUS CONTINUOUS
Status: DISCONTINUED | OUTPATIENT
Start: 2018-07-03 | End: 2018-07-03 | Stop reason: HOSPADM

## 2018-07-03 RX ORDER — DEXTROMETHORPHAN/PSEUDOEPHED 2.5-7.5/.8
1.2 DROPS ORAL
Status: DISCONTINUED | OUTPATIENT
Start: 2018-07-03 | End: 2018-07-03 | Stop reason: HOSPADM

## 2018-07-03 RX ORDER — FLUMAZENIL 0.1 MG/ML
0.2 INJECTION INTRAVENOUS
Status: DISCONTINUED | OUTPATIENT
Start: 2018-07-03 | End: 2018-07-03 | Stop reason: HOSPADM

## 2018-07-03 RX ADMIN — PROPOFOL 100 MG: 10 INJECTION, EMULSION INTRAVENOUS at 15:15

## 2018-07-03 RX ADMIN — PROPOFOL 50 MG: 10 INJECTION, EMULSION INTRAVENOUS at 15:17

## 2018-07-03 RX ADMIN — PROPOFOL 50 MG: 10 INJECTION, EMULSION INTRAVENOUS at 15:21

## 2018-07-03 RX ADMIN — SODIUM CHLORIDE: 9 INJECTION, SOLUTION INTRAVENOUS at 14:19

## 2018-07-03 RX ADMIN — PROPOFOL 140 MCG/KG/MIN: 10 INJECTION, EMULSION INTRAVENOUS at 15:15

## 2018-07-03 RX ADMIN — SODIUM CHLORIDE 50 ML/HR: 900 INJECTION, SOLUTION INTRAVENOUS at 14:52

## 2018-07-03 RX ADMIN — PROPOFOL 20 MG: 10 INJECTION, EMULSION INTRAVENOUS at 15:18

## 2018-07-03 NOTE — IP AVS SNAPSHOT
Viki Aleman 
 
 
 [address] [place] [address] 
[phone] Patient: Ankit Emerson MRN: [id number] :1975 About your hospitalization You were admitted on:  July 3, 2018 You last received care in the:  [facility] ENDOSCOPY You were discharged on:  July 3, 2018 Why you were hospitalized Your primary diagnosis was:  Not on File Follow-up Information None Discharge Orders None A check chasidy indicates which time of day the medication should be taken. My Medications START taking these medications Instructions Each Dose to Equal  
 Morning Noon Evening Bedtime  
 amitriptyline 10 mg tablet Commonly known as:  ELAVIL Your last dose was: Your next dose is: Take 1 Tab by mouth nightly. 10 mg CONTINUE taking these medications Instructions Each Dose to Equal  
 Morning Noon Evening Bedtime BENTYL 20 mg tablet Generic drug:  dicyclomine Your last dose was: Your next dose is: Take 20 mg by mouth four (4) times daily as needed. 20 mg  
    
   
   
   
  
 buPROPion  mg SR tablet Commonly known as:  Yoselin Harris Your last dose was: Your next dose is: Take 150 mg by mouth daily. 150 mg  
    
   
   
   
  
 cholecalciferol 5,000 unit capsule Commonly known as:  VITAMIN D3 Your last dose was: Your next dose is: Take 10,000 Units by mouth daily. 08681 Units  
    
   
   
   
  
 glycopyrrolate 1 mg tablet Commonly known as:  Ramón Rafael Your last dose was: Your next dose is: Take 1 mg by mouth two (2) times a day. Indications: Diarrhea  
 1 mg HUMIRA 40 mg/0.8 mL injection Generic drug:  adalimumab Your last dose was: Your next dose is: 40 mg by SubCUTAneous route every fourteen (14) days. Every other Monday 40 mg  
    
   
   
   
  
 levETIRAcetam 100 mg/mL solution Commonly known as:  KEPPRA Your last dose was: Your next dose is: Take 750 mg by mouth two (2) times a day. 750 mg  
    
   
   
   
  
 morphine IR 15 mg tablet Commonly known as:  MS IR Your last dose was: Your next dose is: Take 1 Tab by mouth every six (6) hours as needed. Max Daily Amount: 60 mg.  
 15 mg  
    
   
   
   
  
 MYRBETRIQ 50 mg ER tablet Generic drug:  mirabegron ER Your last dose was: Your next dose is: Take 50 mg by mouth daily. 50 mg  
    
   
   
   
  
 predniSONE 20 mg tablet Commonly known as:  Floyd Morleyor Your last dose was: Your next dose is: Take 2 tabs PO x 2 days then take 1.5 tabs PO x 2 days then take 1 tab PO x 2 days then take 0.5 tabs PO x 2 days Where to Get Your Medications Information on where to get these meds will be given to you by the nurse or doctor. ! Ask your nurse or doctor about these medications  
  amitriptyline 10 mg tablet Opioid Education Prescription Opioids: What You Need to Know: 
 
    
7/3/2018 Kiko Moon :  1975 [place] Medical Record Number:  [id number] COLONOSCOPY FINDINGS: 
Your ileoscopy showed normal mucosa in the pouch and adequately patent anastomosis, two diminutive superficial ulcerations seen in the ileum, that were biopsied, however doubt that they are causing the pain.  
 
DISCOMFORT: 
 Redness at IV site- apply warm compress to area; if redness or soreness persist- contact your physician There may be a slight amount of blood passed from the rectum Gaseous discomfort- walking, belching will help relieve any discomfort You may not operate a vehicle for 12 hours You may not engage in an occupation involving machinery or appliances for rest of today You may not drink alcoholic beverages for at least 12 hours Avoid making any critical decisions for at least 24 hour DIET: 
 Low fat diet.  however -  remember your colon is empty and a heavy meal will produce gas. Avoid these foods:  vegetables, fried / greasy foods, carbonated drinks for today ACTIVITY: 
You may resume your normal daily activities it is recommended that you spend the remainder of the day resting -  avoid any strenuous activity. CALL M.D. ANY SIGN OF: Increasing pain, nausea, vomiting Abdominal distension (swelling) New increased bleeding (oral or rectal) Fever (chills) Pain in chest area Bloody discharge from nose or mouth Shortness of breath Follow-up Instructions: 
 Call Dr. Miriam Jaime if any questions or problems. Telephone # [phone] Biopsy results will be available in  5 to 7 days Take amitriptyline at bedtime and follow-up with Dr. Franki Whitten and in the office. Introducing [facility]! Kavitha Calloway introduces Do It Original patient portal. Now you can access parts of your medical record, email your doctor's office, and request medication refills online. 1. In your internet browser, go to [email or web address] 2. Click on the First Time User? Click Here link in the Sign In box. You will see the New Member Sign Up page. 3. Enter your Do It Original Access Code exactly as it appears below. You will not need to use this code after youve completed the sign-up process. If you do not sign up before the expiration date, you must request a new code. · [email or web address] Access Code: [email or web address] Expires: 9/6/2018  8:20 AM 
 
4. Enter the last four digits of your Social Security Number (xxxx) and Date of Birth (mm/dd/yyyy) as indicated and click Submit. You will be taken to the next sign-up page. 5. Create a Loladext ID. This will be your [email or web address] login ID and cannot be changed, so think of one that is secure and easy to remember. 6. Create a [email or web address] password. You can change your password at any time. 7. Enter your Password Reset Question and Answer. This can be used at a later time if you forget your password. 8. Enter your e-mail address. You will receive e-mail notification when new information is available in [address] 9. Click Sign Up. You can now view and download portions of your medical record. 10. Click the Download Summary menu link to download a portable copy of your medical information. If you have questions, please visit the Frequently Asked Questions section of the [email or web address] website. Remember, [email or web address] is NOT to be used for urgent needs. For medical emergencies, dial 911. Now available from your iPhone and Android! Introducing Jordon Nielsen As a [employer] patient, I wanted to make you aware of our electronic visit tool called Jordon Alexysangeetahan. [employer] 24/7 allows you to connect within minutes with a medical provider 24 hours a day, seven days a week via a mobile device or tablet or logging into a secure website from your computer. You can access Jordon Nielsen from anywhere in the [place]. A virtual visit might be right for you when you have a simple condition and feel like you just dont want to get out of bed, or cant get away from work for an appointment, when your regular [employer] provider is not available (evenings, weekends or holidays), or when youre out of town and need minor care.   Electronic visits cost only $49 and if the [facility] 24/7 provider determines a prescription is needed to treat your condition, one can be electronically transmitted to a nearby pharmacy*. Please take a moment to enroll today if you have not already done so. The enrollment process is free and takes just a few minutes. To enroll, please download the [employer] 24/7 jd to your tablet or phone, or visit [email or web address] to enroll on your computer. And, as an [address] [place] [address] patient with a [employer] account, the results of your visits will be scanned into your electronic medical record and your primary care provider will be able to view the scanned results. We urge you to continue to see your regular [employer] [facility] provider for your ongoing medical care. And while your primary care provider may not be the one available when you seek a [employer] Visionsangeetafin virtual visit, the peace of mind you get from getting a real diagnosis real time can be priceless. For more information on xzoops, view our Frequently Asked Questions (FAQs) at [email or web address]. Sincerely, 
 
Nicky Beaulieu MD 
Chief Medical Officer [address] [place] [address] *:  certain medications cannot be prescribed via xzoops Providers Seen During Your Hospitalization Provider Specialty Primary office phone Judy Stone MD Gastroenterology [phone] Your Primary Care Physician (PCP) Primary Care Physician Office Phone Office Fax Adela Leonardo [phone] You are allergic to the following Allergen Reactions Vancomycin Hives Recent Documentation Height Weight Breastfeeding? BMI OB Status Smoking Status 1.6 m 61.2 kg No 23.91 kg/m2 Having regular periods Never Smoker Emergency Contacts Name Discharge Info Relation Home Work Mobile [facility] DISCHARGE CAREGIVER [3] Mother [14] [phone] Patient Belongings The following personal items are in your possession at time of discharge: 
  Dental Appliances: None  Visual Aid: None Please provide this summary of care documentation to your next provider. Signatures-by signing, you are acknowledging that this After Visit Summary has been reviewed with you and you have received a copy. Patient Signature:  ____________________________________________________________ Date:  ____________________________________________________________  
  
[facility] Provider Signature:  ____________________________________________________________ Date:  ____________________________________________________________

## 2018-07-03 NOTE — PROCEDURES
Rosendo Manzanares M.D.  [phone]            7/3/2018          Ileoscopy Operative Report  Yareli Chowdary  :  1975  [place] Medical Record Number:  [id number]      Indications:    Rectal pain     :  Valencia Vargas MD    Referring Provider: Ness Bernard MD    Sedation:  MAC anesthesia    Pre-Procedural Exam:      Airway: clear,  No airway problems anticipated  Heart: RRR, without gallops or rubs  Lungs: clear bilaterally without wheezes, crackles, or rhonchi  Abdomen: soft, nontender, nondistended, bowel sounds present  Mental Status: awake, alert and oriented to person, place and time     Procedure Details:  After informed consent was obtained with all risks and benefits of procedure explained and preoperative exam completed, the patient was taken to the endoscopy suite and placed in the left lateral decubitus position. Upon sequential sedation as per above, a digital rectal exam was performed. The Olympus videocolonoscope  was inserted in the rectum and carefully advanced to the terminal ileum. The quality of preparation was good. The colonoscope was slowly withdrawn with careful inspection and evaluation between folds. Retroflexion in the rectum was performed. Findings:   The pediatric scope was advanced through the anal canal into the J-pouch. Mucosa appeared within normal throughout the pouch, no inflammation or lesions seen. Adequately patent anastomosis was noted without any inflammation. The scope then continued on to the ileum and about 30 cm of ileum mucosa was evaluated. Two small superficial localized erosions were noted just above the anastomosis, otherwise mucosa appeared within normal. No luminal narrowing or strictures seen. Interventions:  none    Specimen Removed:  distal ileum biopsies    Complications: None. EBL:  None.     Impression:  Two diminutive superficial erosions seen in the distal ileum of minimal significance, otherwise mucosa within normal in the remaining distal ileum and J-pouch. Recommendations:  - Continue current medications  - Await office visit with Dr. Payal Smtih later this week  - Consider adding amitriptyline  - Follow-up in the office    Discharge Disposition:  Home in the company of a  when able to ambulate.     Beulah Silva MD  7/3/2018  3:31 PM

## 2018-07-03 NOTE — ANESTHESIA POSTPROCEDURE EVALUATION
Post-Anesthesia Evaluation and Assessment    Patient: Mando Smith MRN: [id number]  SSN: [id number]    YOB: 1975  Age: 38 y.o. Sex: female       Cardiovascular Function/Vital Signs  Visit Vitals    /67    Pulse 87    Temp 36.7 °C (98 °F)    Resp 17    Ht 5' 3\" (1.6 m)    Wt 61.2 kg (135 lb)    SpO2 99%    Breastfeeding No    BMI 23.91 kg/m2       Patient is status post MAC anesthesia for Procedure(s):  COLONOSCOPY  biopsy terminal ileum. Nausea/Vomiting: None    Postoperative hydration reviewed and adequate. Pain:  Pain Scale 1: Numeric (0 - 10) (07/03/18 1556)  Pain Intensity 1: 0 (07/03/18 1556)   Managed    Neurological Status: At baseline    Mental Status and Level of Consciousness: Arousable    Pulmonary Status:   O2 Device: Room air (07/03/18 1556)   Adequate oxygenation and airway patent    Complications related to anesthesia: None    Post-anesthesia assessment completed.  No concerns    Signed By: Wade Liu MD     July 3, 2018

## 2018-07-03 NOTE — PROGRESS NOTES
Dr Dickey Keep in to speak to patient and mom  Discharge instructions given bedsdie with patient and mom Caron

## 2018-07-03 NOTE — ANESTHESIA PREPROCEDURE EVALUATION
Anesthetic History          Comments: ? Shaking from propofol. Review of Systems / Medical History  Patient summary reviewed and nursing notes reviewed    Pulmonary  Within defined limits                 Neuro/Psych     seizures: well controlled    Psychiatric history    Comments: Bipolar disorder Cardiovascular                  Exercise tolerance: >4 METS     GI/Hepatic/Renal               Comments: Ulcerative colitis  Crohn's disease Endo/Other  Within defined limits           Other Findings              Physical Exam    Airway  Mallampati: II    Neck ROM: normal range of motion   Mouth opening: Normal     Cardiovascular    Rhythm: regular  Rate: normal         Dental  No notable dental hx       Pulmonary  Breath sounds clear to auscultation               Abdominal         Other Findings            Anesthetic Plan    ASA: 2  Anesthesia type: MAC          Induction: Intravenous  Anesthetic plan and risks discussed with: Patient      Informed consent obtained.

## 2018-07-03 NOTE — DISCHARGE INSTRUCTIONS
[facility]. Anna Stone M.D.  [phone]            COLON DISCHARGE INSTRUCTIONS       7/3/2018    Dereck Carter  :  1975  [place] Medical Record Number:  [id number]      COLONOSCOPY FINDINGS:  Your ileoscopy showed normal mucosa in the pouch and adequately patent anastomosis, two diminutive superficial ulcerations seen in the ileum, that were biopsied, however doubt that they are causing the pain. DISCOMFORT:  Redness at IV site- apply warm compress to area; if redness or soreness persist- contact your physician  There may be a slight amount of blood passed from the rectum  Gaseous discomfort- walking, belching will help relieve any discomfort  You may not operate a vehicle for 12 hours  You may not engage in an occupation involving machinery or appliances for rest of today  You may not drink alcoholic beverages for at least 12 hours  Avoid making any critical decisions for at least 24 hour  DIET:   Low fat diet. - however -  remember your colon is empty and a heavy meal will produce gas. Avoid these foods:  vegetables, fried / greasy foods, carbonated drinks for today     ACTIVITY:  You may resume your normal daily activities it is recommended that you spend the remainder of the day resting -  avoid any strenuous activity. CALL M.D. ANY SIGN OF:   Increasing pain, nausea, vomiting  Abdominal distension (swelling)  New increased bleeding (oral or rectal)  Fever (chills)  Pain in chest area  Bloody discharge from nose or mouth   Shortness of breath    Follow-up Instructions:   Call Dr. Yue Woody if any questions or problems. Telephone # [phone]  Biopsy results will be available in  5 to 7 days  Take amitriptyline at bedtime and follow-up with Dr. Nelida Ren and in the office.

## 2018-07-03 NOTE — H&P
The patient is a 38year old female who presents with a complaint of Abdominal Pain. The patient presents for for routine follow-up. The onset of the abdominal pain has been acute and has been occurring in a persistent pattern for 1 day. The abdominal pain is described as sharp pain and  is described as being located in the suprapubic area .  The abdominal pain does not radiate. The symptoms are aggravated by nothing. The symptoms have no relieving factors. The symptoms have been associated with bloating,  while the symptoms have not been associated with bloody stools, black stools or vomiting. Previous diagnostic tests have included colonoscopy (Pouchoscopy last week and a stricture was dilated. ). Problem List/Past Medical (Rebeca Sangeetha; 6/28/2018 2:53 PM)  Pouchitis    Depression    Arthritis    Epilepsy    Bipolar Disorder   Type 2  J-pouch  [1995]:  Mouth ulcers (528.9  K12.1)    Abdominal distention (787.3  R14.0)    Elevated liver enzymes (790.4  R74.8)    Recurrent lower abdominal pain (789.09  R10.30)    Other intestinal malabsorption (579.8  K90.89)    Nausea (787.02  R11.0)    Crohn's disease (555.9  K50.90)   Pt seen in office for follow up of abdominal pain. She has hx of crohns and has J pouch. Pain has persisted despite antispasmodics and glycopyrolate. CT done recently shows no acute process. Regional enteritis of small intestine (555.0  K50.00)    Diarrhea, functional (564.5  K59.1)      Past Surgical History (Rebeca Sangeetha; 6/28/2018 2:53 PM)  Colectomy    Breast Augmentation    Cholecystectomy    Tonsillectomy   with adenoidectomy    Allergies (Rebeca Huge; 6/28/2018 2:53 PM)  Vancomycin HCl *ANTI-INFECTIVE AGENTS - MISC. *      Medication History (Jose Lubin; 6/28/2018 2:59 PM)  PredniSONE  (10MG (48) Tab Ther Pack, 20mg Oral daily tapering down) Active. Levaquin  (250MG Tablet, 1 Oral bid) Active. Flagyl  (500MG Tablet, 1 Oral bid) Active.   Lomotil  (2.5-0.025MG Tablet, 1 Oral as needed, Taken starting 01/29/2014) Active. Humira Pen  (40MG/0.8ML Pen-inj Kit, 1 Subcutaneous every week, Taken starting 12/19/2017) Active. (Use a directed; We have discussed the risks and benefits of anti-TNF therapy at length. The possible adverse events include but are not limited to injection site reactions, infusion reactions, infections, demyelinating disease, heart failure, malignancy, induction of autoimmunity and headache. Therapy is only initiated when the benefits outweight the risks of therapy. The patient expressed understanding, and all questions were answered.)  Hyoscyamine Sulfate  (0.125MG Tablet, 1 (one) Tablet Oral BID, Taken starting 06/08/2018) Active. Glycopyrrolate  (1MG Tablet, 1 (one) Tablet Oral as needed every 6 hours, Taken starting 06/12/2018) Active. Wellbutrin SR  (200MG Tablet ER 12HR, Oral daily) Active. Multivitamin  (Oral 1 po qd) Active. Vitamin D  (2 Oral in the afternoon) Specific strength unknown - Active. (2,000 mg)  Keppra  (100MG/ML Solution, 7.5 ml Oral bid) Active. (7.5ml (1500mg TTL))  Medications Reconciled     Family History ([facility]; 6/28/2018 2:53 PM)  Irritable bowel syndrome   Mother. Gallbladder Disease   Brother. Ulcerative colitis   First Degree Relatives. Social History ([facility]; 6/28/2018 2:53 PM)  Alcohol Use   Has never drank. Marital status   Single. Blood Transfusion   No.  Tobacco Use   Never smoker. Employment status   Disabled. Diagnostic Studies History ([facility]; 6/28/2018 2:53 PM)  G. I. x-ray    Endoscopy  [2010]:  Colonoscopy  [2010]:    Health Maintenance History ([facility]; 6/28/2018 2:53 PM)  Pneumovax   Date: 10/2014. Flu Vaccine   Date: 2017. Review of Systems (Mcpherson Bee; 6/28/2018 2:53 PM)  General Not Present- Chronic Fatigue, Poor Appetite, Weight Gain and Weight Loss. Skin Not Present- Itching, Rash and Skin Color Changes.   HEENT Not Present- Hearing Loss, Ringing in the Ears and Vertigo. Respiratory Not Present- Asthma, Chronic Cough, Difficulty Breathing, Hoarseness, Lung Cancer, TB exposure and Wheezing. Cardiovascular Present- Hypertension. Not Present- Bypass Surgery, Chest Pain, Chest Pain with Exertion, Defibrillator, Heart Murmur, History of Heart Attack, Pacemaker, Shortness of Breath, Use of Antibiotics before Dental Procedures and Use of Blood Thinners. Gastrointestinal Present- History of Previous Endoscopy. Not Present- Abdominal Pain, Black, Tarry Stool, Bloody Stool, Chest Pain, Cirrhosis, Colon Cancer, Constipation, Crohns disease, Diarrhea, Difficulty Swallowing, Dysphagia, Esophageal Blockage, Esophageal Cancer, Gallbladder Disease, Heartburn, Hepatitis, Hiatal Hernia, History of Previous Colonoscopy, History of Previous GI X-rays, Indigestion, Intestinal Blockage, Jaundice, Nausea, Polyps, Rectal Bleeding, Stomach Cancer, Ulcer, Ulcerative Colitis, Vomiting and Weight Loss. Musculoskeletal Present- Arthritis. Not Present- Collagen Vascular Disease, Hip Replacement Surgery and Knee Replacement Surgery. Neurological Not Present- Blurred Vision, Double Vision, Frequent Headaches, Migraine Headaches, Seizures, Stroke and Vertigo. Psychiatric Present- Depression. Not Present- Difficulty sleeping, Panic Attacks and Suicidal Ideation. Endocrine Not Present- Complications from Diabetes, Diabetes and Thyroid Problems. Hematology Not Present- Anemia, Bruising and Easy Bleeding. Note:  reviewed by this MD on 11/30/10      Vitals (Rosalia Elkins; 6/28/2018 2:56 PM)  6/28/2018 2:53 PM  Weight: 140 lb   Height: 63 in   Height was reported by patient. Body Surface Area: 1.66 m²   Body Mass Index: 24.8 kg/m²    Temp.: 98.4° F (Temporal)    Pulse: 98 (Regular)    Resp. : 16 (Unlabored)     BP: 126/82 (Sitting, Left Arm, Standard)              Physical Exam (Reji Amezcua; 6/28/2018 3:54 PM)  General  Mental Status - Alert.   General Appearance - Cooperative, Pleasant, Not in acute distress. Orientation - Oriented X3. Integumentary  General Characteristics  Color - normal coloration of skin. Head and Neck  Neck  Global Assessment - supple. Eye  Sclera/Conjunctiva - Bilateral - No Jaundice. Chest and Lung Exam  Chest and lung exam reveals  - quiet, even and easy respiratory effort with no use of accessory muscles. Auscultation  Breath sounds - Normal. Adventitious sounds - No Adventitious sounds. Cardiovascular  Auscultation  Rhythm - Regular, No Tachycardia, No Bradycardia . Heart Sounds - Normal heart sounds , S1 WNL and S2 WNL, No S3, No Summation Gallop. Murmurs & Other Heart Sounds - Auscultation of the heart reveals - No Murmurs. Abdomen  Palpation/Percussion  Tenderness - Periumbilical. Rebound tenderness - No rebound. Rigidity (guarding) - No Rigidity. Dullness to percussion - No abnormal dullness to percussion. Liver - No hepatosplenomegaly. Abdominal Mass Palpable - No masses. Other Characteristics - No Ascites. Auscultation  Auscultation of the abdomen reveals - Bowel sounds normal, No Abdominal bruits and No Succussion splash. Rectal - Did not examine. Peripheral Vascular  Lower Extremity  Palpation - Edema - Left - No edema. Right - No edema. Neurologic  Motor  Involuntary Movements - Asterixis - not present. Assessment & Plan (Reji Eaton [facility]; 6/28/2018 3:55 PM)  Crohn's disease (555.9  K50.90)  Story: Pt seen in office for follow up of abdominal pain. She has hx of crohns and has a J pouch. She has had similar symptoms and was hostpitalized and had a narrowing of her bowel stretched endoscopicaly. Her pain was improved for 2 days but has returned 1 day ago. .  Impression: DDx: Adhesions, Strictures. Plan: Have ordered another ileoscopy to determine if area previously dilated remains widely patent and to assess for possible adhesions.  Instructed her to take mag citrate and enema for her prep. Follow up: In office after procedure. Current Plans  DIAGNOSTIC ILEOSCOPY (83098)  Pt Education - How to access health information online: discussed with patient and provided information.

## 2018-07-03 NOTE — ROUTINE PROCESS
[facility]  1975  [id number]    Situation:  Verbal report received from: Aldo Serna  Procedure: Procedure(s):  COLONOSCOPY  biopsy terminal ileum    Background:    Preoperative diagnosis: ABDOMINAL PAIN   Postoperative diagnosis: S/P colectomy  ulceration terminal ileum    :  Dr. Chavez Nunez  Assistant(s): Endoscopy Technician-1: Devan Bronson  Endoscopy RN-1: Niya Craig RN    Specimens:   ID Type Source Tests Collected by Time Destination   1 : biopsy terminal ileum Preservative Ileum  Kary Jaramillo MD 7/3/2018 1524 Pathology     H. Pylori  no    Assessment:  Intra-procedure medications   Anesthesia gave intra-procedure sedation and medications, see anesthesia flow sheet yes    Intravenous fluids: NS@ KVO     Vital signs stable     Abdominal assessment: round and soft     Recommendation:  Discharge patient per MD order  Family or Friend   Permission to share finding with family or friend yes

## 2019-01-18 NOTE — PATIENT DISCUSSION
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 - Plan : Patient advised to check [employer] regularly (once weekly or more) and use nutraceuticals such as AREDS 2 eye vitamins. Wear sunglasses when outdoors and eat green, leafy vegetables to maintain ocular health.    RTC 6 month follow up/OCT mac

## 2019-12-26 ENCOUNTER — APPOINTMENT (OUTPATIENT)
Dept: GENERAL RADIOLOGY | Age: 44
End: 2019-12-26
Attending: NURSE PRACTITIONER
Payer: MEDICARE

## 2019-12-26 ENCOUNTER — HOSPITAL ENCOUNTER (EMERGENCY)
Age: 44
Discharge: HOME | End: 2019-12-26
Attending: EMERGENCY MEDICINE
Payer: MEDICARE

## 2019-12-26 VITALS
HEART RATE: 120 BPM | TEMPERATURE: 99.9 F | SYSTOLIC BLOOD PRESSURE: 123 MMHG | WEIGHT: 130 LBS | DIASTOLIC BLOOD PRESSURE: 83 MMHG | HEIGHT: 63 IN | BODY MASS INDEX: 23.04 KG/M2 | OXYGEN SATURATION: 99 % | RESPIRATION RATE: 16 BRPM

## 2019-12-26 DIAGNOSIS — J18.9 COMMUNITY ACQUIRED PNEUMONIA OF LEFT LOWER LOBE OF LUNG: Primary | ICD-10-CM

## 2019-12-26 DIAGNOSIS — H66.90 ACUTE OTITIS MEDIA, UNSPECIFIED OTITIS MEDIA TYPE: ICD-10-CM

## 2019-12-26 LAB
ALBUMIN SERPL-MCNC: 3.4 G/DL (ref 3.5–5)
ALBUMIN/GLOB SERPL: 0.7 {RATIO} (ref 1.1–2.2)
ALP SERPL-CCNC: 136 U/L (ref 45–117)
ALT SERPL-CCNC: 58 U/L (ref 12–78)
ANION GAP SERPL CALC-SCNC: 6 MMOL/L (ref 5–15)
APPEARANCE UR: CLEAR
AST SERPL-CCNC: 41 U/L (ref 15–37)
BACTERIA URNS QL MICRO: ABNORMAL /HPF
BASOPHILS # BLD: 0.1 K/UL (ref 0–0.1)
BASOPHILS NFR BLD: 0 % (ref 0–1)
BILIRUB SERPL-MCNC: 0.4 MG/DL (ref 0.2–1)
BILIRUB UR QL: NEGATIVE
BUN SERPL-MCNC: 6 MG/DL (ref 6–20)
BUN/CREAT SERPL: 7 (ref 12–20)
CALCIUM SERPL-MCNC: 8.9 MG/DL (ref 8.5–10.1)
CHLORIDE SERPL-SCNC: 104 MMOL/L (ref 97–108)
CO2 SERPL-SCNC: 27 MMOL/L (ref 21–32)
COLOR UR: ABNORMAL
COMMENT, HOLDF: NORMAL
CREAT SERPL-MCNC: 0.84 MG/DL (ref 0.55–1.02)
DIFFERENTIAL METHOD BLD: ABNORMAL
EOSINOPHIL # BLD: 0 K/UL (ref 0–0.4)
EOSINOPHIL NFR BLD: 0 % (ref 0–7)
EPITH CASTS URNS QL MICRO: ABNORMAL /LPF
ERYTHROCYTE [DISTWIDTH] IN BLOOD BY AUTOMATED COUNT: 11.9 % (ref 11.5–14.5)
GLOBULIN SER CALC-MCNC: 4.7 G/DL (ref 2–4)
GLUCOSE SERPL-MCNC: 103 MG/DL (ref 65–100)
GLUCOSE UR STRIP.AUTO-MCNC: NEGATIVE MG/DL
HCG UR QL: NEGATIVE
HCT VFR BLD AUTO: 43 % (ref 35–47)
HGB BLD-MCNC: 14.7 G/DL (ref 11.5–16)
HGB UR QL STRIP: ABNORMAL
IMM GRANULOCYTES # BLD AUTO: 0.2 K/UL (ref 0–0.04)
IMM GRANULOCYTES NFR BLD AUTO: 1 % (ref 0–0.5)
KETONES UR QL STRIP.AUTO: NEGATIVE MG/DL
LEUKOCYTE ESTERASE UR QL STRIP.AUTO: ABNORMAL
LIPASE SERPL-CCNC: 28 U/L (ref 73–393)
LYMPHOCYTES # BLD: 2.9 K/UL (ref 0.8–3.5)
LYMPHOCYTES NFR BLD: 12 % (ref 12–49)
MCH RBC QN AUTO: 32.7 PG (ref 26–34)
MCHC RBC AUTO-ENTMCNC: 34.2 G/DL (ref 30–36.5)
MCV RBC AUTO: 95.6 FL (ref 80–99)
MONOCYTES # BLD: 1.4 K/UL (ref 0–1)
MONOCYTES NFR BLD: 6 % (ref 5–13)
NEUTS SEG # BLD: 19.5 K/UL (ref 1.8–8)
NEUTS SEG NFR BLD: 81 % (ref 32–75)
NITRITE UR QL STRIP.AUTO: NEGATIVE
NRBC # BLD: 0 K/UL (ref 0–0.01)
NRBC BLD-RTO: 0 PER 100 WBC
PH UR STRIP: 6.5 [PH] (ref 5–8)
PLATELET # BLD AUTO: 279 K/UL (ref 150–400)
PMV BLD AUTO: 8.7 FL (ref 8.9–12.9)
POTASSIUM SERPL-SCNC: 3.7 MMOL/L (ref 3.5–5.1)
PROT SERPL-MCNC: 8.1 G/DL (ref 6.4–8.2)
PROT UR STRIP-MCNC: NEGATIVE MG/DL
RBC # BLD AUTO: 4.5 M/UL (ref 3.8–5.2)
RBC #/AREA URNS HPF: ABNORMAL /HPF (ref 0–5)
SAMPLES BEING HELD,HOLD: NORMAL
SODIUM SERPL-SCNC: 137 MMOL/L (ref 136–145)
SP GR UR REFRACTOMETRY: 1.01 (ref 1–1.03)
UR CULT HOLD, URHOLD: NORMAL
UROBILINOGEN UR QL STRIP.AUTO: 0.2 EU/DL (ref 0.2–1)
WBC # BLD AUTO: 24 K/UL (ref 3.6–11)
WBC URNS QL MICRO: ABNORMAL /HPF (ref 0–4)

## 2019-12-26 PROCEDURE — 81025 URINE PREGNANCY TEST: CPT

## 2019-12-26 PROCEDURE — 87147 CULTURE TYPE IMMUNOLOGIC: CPT

## 2019-12-26 PROCEDURE — 81001 URINALYSIS AUTO W/SCOPE: CPT

## 2019-12-26 PROCEDURE — 74011250636 HC RX REV CODE- 250/636: Performed by: NURSE PRACTITIONER

## 2019-12-26 PROCEDURE — 74011250636 HC RX REV CODE- 250/636: Performed by: EMERGENCY MEDICINE

## 2019-12-26 PROCEDURE — 85025 COMPLETE CBC W/AUTO DIFF WBC: CPT

## 2019-12-26 PROCEDURE — 87086 URINE CULTURE/COLONY COUNT: CPT

## 2019-12-26 PROCEDURE — 74011250637 HC RX REV CODE- 250/637: Performed by: EMERGENCY MEDICINE

## 2019-12-26 PROCEDURE — 36415 COLL VENOUS BLD VENIPUNCTURE: CPT

## 2019-12-26 PROCEDURE — 83690 ASSAY OF LIPASE: CPT

## 2019-12-26 PROCEDURE — 80053 COMPREHEN METABOLIC PANEL: CPT

## 2019-12-26 PROCEDURE — 71046 X-RAY EXAM CHEST 2 VIEWS: CPT

## 2019-12-26 PROCEDURE — 99284 EMERGENCY DEPT VISIT MOD MDM: CPT

## 2019-12-26 PROCEDURE — 96374 THER/PROPH/DIAG INJ IV PUSH: CPT

## 2019-12-26 RX ORDER — LEVOFLOXACIN 750 MG/1
750 TABLET ORAL DAILY
Qty: 6 TAB | Refills: 0 | Status: SHIPPED | OUTPATIENT
Start: 2019-12-26 | End: 2020-01-01

## 2019-12-26 RX ORDER — LEVOFLOXACIN 750 MG/1
750 TABLET ORAL
Status: COMPLETED | OUTPATIENT
Start: 2019-12-26 | End: 2019-12-26

## 2019-12-26 RX ORDER — BENZONATATE 100 MG/1
100 CAPSULE ORAL
Status: COMPLETED | OUTPATIENT
Start: 2019-12-26 | End: 2019-12-26

## 2019-12-26 RX ORDER — BENZONATATE 100 MG/1
100 CAPSULE ORAL
Qty: 30 CAP | Refills: 0 | Status: SHIPPED | OUTPATIENT
Start: 2019-12-26 | End: 2020-01-02

## 2019-12-26 RX ORDER — ONDANSETRON 2 MG/ML
4 INJECTION INTRAMUSCULAR; INTRAVENOUS
Status: COMPLETED | OUTPATIENT
Start: 2019-12-26 | End: 2019-12-26

## 2019-12-26 RX ORDER — BENZONATATE 100 MG/1
100 CAPSULE ORAL
Status: DISCONTINUED | OUTPATIENT
Start: 2019-12-26 | End: 2019-12-26

## 2019-12-26 RX ADMIN — BENZONATATE 100 MG: 100 CAPSULE ORAL at 16:39

## 2019-12-26 RX ADMIN — LEVOFLOXACIN 750 MG: 750 TABLET, FILM COATED ORAL at 14:57

## 2019-12-26 RX ADMIN — SODIUM CHLORIDE 1000 ML: 900 INJECTION, SOLUTION INTRAVENOUS at 13:04

## 2019-12-26 RX ADMIN — ONDANSETRON 4 MG: 2 INJECTION INTRAMUSCULAR; INTRAVENOUS at 14:57

## 2019-12-26 NOTE — ED NOTES
Verbal/Bedside report was given to Elissa Hammer RN who will assume care of patient at this time. SBAR report consisted of ED summary, MAR, recent results, and additional pertinent information. Receiving nurse given opportunity to ask questions and seek clarifications. Receiving nurse aware of pending lab results and orders that are to be completed.

## 2019-12-26 NOTE — ED TRIAGE NOTES
Patient referred by PCP for high white blood cells, reports abdominal pain with nausea and vomiting and non productive cough. Was told by PCP she has a left ear infection.  Negative for flu at PCP

## 2019-12-26 NOTE — ED NOTES
Patient given discharge instructions per provider, two prescriptions given.  Patient verbalized understanding and ambulatory from ED to home with spouse

## 2019-12-26 NOTE — ED PROVIDER NOTES
This is a 78-year-old female who presents ambulatory to the emergency room for leukocytosis. Patient was seen at her PCP and was sent to the emergency room for evaluation of a high white blood cell count. Patient has suffered from a head cold, cough for a few days as well as a left ear infection. Patient states that she has had a nonproductive cough, with a history of pneumonia in the past.  Also reports she has minimal abdominal pain but she does have nausea and vomiting, positive chills with a subjective fever. Denies chest pain, shortness of breath, dizziness. Patient had a flu swab at her PCP that was negative. There are no further complaints at this time. Prince Walker MD  Past Medical History:  No date: Bipolar 2 disorder ([address]  No date: Colitis, ulcerative chronic ([address]  No date: Crohn disease ([address]  No date: Depression  No date: Epilepsy ([address]  9/13/2010: Pancreatitis  No date: Seizure ([address]  9/13/2010: Ulcerative colitis ([address]  Past Surgical History:  6/21/2018: COLONOSCOPY; N/A      Comment:  COLONOSCOPY performed by Kodak Luna MD at [facility]                ENDOSCOPY  7/3/2018: COLONOSCOPY; N/A      Comment:  COLONOSCOPY performed by Daniel Ordonez MD at [facility] ENDOSCOPY  10/26/2016: Joo Van; N/A      Comment:  FLEXIBLE SIGMOIDOSCOPY  performed by Daniel Ordonez MD at                [facility] ENDOSCOPY  No date: HX APPENDECTOMY  1995: HX COLECTOMY      Comment:  with J pouch  No date: HX TONSIL AND ADENOIDECTOMY      Comment:  age 6  ----  53 y.o. female with past medical history significant for pancreatitis, Crohn's disease, ulcerative colitis, seizure, depression, epilepsy and bipolar disorder who presents via POV with chief complaint of abnormal lab result. Pt was seen by her PCP today and advised to come to the ED due to an increased white blood cell count. She c/o cough, ear pain, vomiting, fever, and abdominal pain that began 3 days ago.  Her sxs began as cold sxs including rhinorrhea, and cough. She also endorses diaphoresis, chills, nausea, diarrhea, and decreased appetite. She denies blood in stool, rash, dysuria, hematuria, and urinary frequency. Pt notes this feels different form a Crohn's flare up. She has tried nyquil, dayquil, mucinex, and nose drops with temporary relief. She has not tried any breathing treatments. She notes a co-worker recently had pneumonia. Her LMP was approximately 2 weeks ago. She endorses hx of UTI. There are no other acute medical concerns at this time. Social hx: denies tobacco use, EtOH use, and illicit drug use. PCP: Kory Duncan MD      Note written by Ray Finn, as dictated by Mandy Marin DO 2:32 PM      The history is provided by the patient and the spouse. No  was used.         Past Medical History:   Diagnosis Date    Bipolar 2 disorder ([address]     Colitis, ulcerative chronic (HCC)     Crohn disease ([address]     Depression     Epilepsy ([address]     Pancreatitis 9/13/2010    Seizure ([address]     Ulcerative colitis ([address] 9/13/2010       Past Surgical History:   Procedure Laterality Date    COLONOSCOPY N/A 6/21/2018    COLONOSCOPY performed by Nyasia Hanna MD at [address] [place] [address] COLONOSCOPY N/A 7/3/2018    COLONOSCOPY performed by Debbi Loja MD at [address] [place] [address] N/A 10/26/2016    FLEXIBLE SIGMOIDOSCOPY  performed by Debbi Loja MD at [address] [place] [address] HX APPENDECTOMY       [facility]    with J pouch    HX TONSIL AND ADENOIDECTOMY      age 6         Family History:   Family history unknown: Yes       Social History     Socioeconomic History    Marital status: SINGLE     Spouse name: Not on file    Number of children: Not on file    Years of education: Not on file    Highest education level: Not on file   Occupational History    Not on file   Social Needs    Financial resource strain: Not on file    Food insecurity:     Worry: Not on file     Inability: Not on file   [address] [place] [address] Transportation needs:     Medical: Not on file     Non-medical: Not on file   Tobacco Use    Smoking status: Never Smoker    Smokeless tobacco: Never Used   Substance and Sexual Activity    Alcohol use: No    Drug use: No    Sexual activity: Not on file   Lifestyle    Physical activity:     Days per week: Not on file     Minutes per session: Not on file    Stress: Not on file   Relationships    Social connections:     Talks on phone: Not on file     Gets together: Not on file     Attends Taoist service: Not on file     Active member of club or organization: Not on file     Attends meetings of clubs or organizations: Not on file     Relationship status: Not on file    Intimate partner violence:     Fear of current or ex partner: Not on file     Emotionally abused: Not on file     Physically abused: Not on file     Forced sexual activity: Not on file   Other Topics Concern    Not on file   Social History Narrative    Not on file         ALLERGIES: Vancomycin    Review of Systems   Constitutional: Positive for chills and fever. Negative for activity change, appetite change, diaphoresis and fatigue. HENT: Positive for ear pain (left). Negative for congestion, ear discharge, rhinorrhea, sinus pressure, sinus pain, sneezing, sore throat and trouble swallowing. Eyes: Negative for photophobia, pain, redness and visual disturbance. Respiratory: Positive for cough. Negative for chest tightness, shortness of breath and wheezing. Cardiovascular: Negative for chest pain and palpitations. Gastrointestinal: Positive for abdominal pain, nausea and vomiting. Negative for abdominal distention, blood in stool, constipation and diarrhea. Endocrine: Negative. Genitourinary: Negative for difficulty urinating, dysuria, flank pain, frequency, hematuria, menstrual problem, urgency, vaginal bleeding and vaginal discharge.    Musculoskeletal: Negative for arthralgias, back pain, myalgias, neck pain and neck stiffness. Skin: Negative for color change, pallor, rash and wound. Allergic/Immunologic: Negative. Neurological: Negative for dizziness, syncope, speech difficulty, weakness, numbness and headaches. Hematological: Does not bruise/bleed easily. Psychiatric/Behavioral: Negative for behavioral problems, self-injury and suicidal ideas. The patient is not nervous/anxious. All other systems reviewed and are negative. Vitals:    12/26/19 1257   BP: 139/63   Pulse: (!) 117   Resp: 16   Temp: 98.8 °F (37.1 °C)   SpO2: 96%   Weight: 59 kg (130 lb)   Height: 5' 3\" (1.6 m)            Physical Exam  Vitals signs and nursing note reviewed. Constitutional:       General: She is not in acute distress. Appearance: She is well-developed. Comments: Pleasant, laughing   HENT:      Head: Normocephalic and atraumatic. Right Ear: Tympanic membrane, ear canal and external ear normal.      Left Ear: External ear normal.      Ears:      Comments: Left otitis media. No mastoid tenderness. Scarrs present from previous tympanostomy tube. Nose: Congestion present. Mouth/Throat:      Mouth: Mucous membranes are moist.   Eyes:      General:         Right eye: No discharge. Left eye: No discharge. Conjunctiva/sclera: Conjunctivae normal.      Pupils: Pupils are equal, round, and reactive to light. Neck:      Musculoskeletal: Normal range of motion and neck supple. Vascular: No JVD. Trachea: No tracheal deviation. Cardiovascular:      Rate and Rhythm: Regular rhythm. Tachycardia present. Pulses: Normal pulses. Heart sounds: Normal heart sounds. No murmur. No gallop. Pulmonary:      Effort: Pulmonary effort is normal. No respiratory distress. Breath sounds: Rhonchi (L lower lobe) present. No wheezing. Comments: Diminished breath sounds throughout    Chest:      Chest wall: No tenderness.    Abdominal:      General: Bowel sounds are normal. There is no distension. Palpations: Abdomen is soft. Tenderness: There is no tenderness. There is no guarding or rebound. Comments: No suprapubic tenderness, or CVA tenderness. Genitourinary:     Comments: Negative    Musculoskeletal: Normal range of motion. General: No tenderness. Skin:     General: Skin is warm and dry. Coloration: Skin is not pale. Findings: No erythema or rash. Neurological:      Mental Status: She is alert and oriented to person, place, and time. Motor: No weakness. Coordination: Coordination normal.   Psychiatric:         Behavior: Behavior normal.         Thought Content: Thought content normal.         Judgment: Judgment normal.      Note written by Ray Piedra, as dictated by Stewart Marcos DO 3:30 PM      MDM   49-year-old female with history of IBD, epilepsy presents from her primary care provider for further evaluation after she had abnormal blood work and URI symptoms. Labs were drawn and returned showing WBC of 24, but otherwise reassuring. Patient is smiling and cooperative in no distress but tachycardic normal blood pressure. Afebrile. Chest x-ray was viewed by myself showing left lower lobe pneumonia. Patient notes history of similar symptoms previously and states that she has taken Levaquin before with good results. Will Rx course of Levaquin to further treat and recommended close follow-up with her primary care provider. She was given IV fluid bolus in the ED and was given first dose of Levaquin in the ED and had improvement in her tachycardia. Strict return precautions were given for worsening or concerns.       Procedures

## 2019-12-27 LAB
BACTERIA SPEC CULT: ABNORMAL
CC UR VC: ABNORMAL
SERVICE CMNT-IMP: ABNORMAL

## 2019-12-28 RX ORDER — CEPHALEXIN 500 MG/1
500 CAPSULE ORAL 2 TIMES DAILY
Qty: 10 CAP | Refills: 0 | Status: SHIPPED | OUTPATIENT
Start: 2019-12-28 | End: 2020-01-02

## 2019-12-28 NOTE — PROGRESS NOTES
Pt returned call. She reports mild discomfort with urination.   [facility] for keflex 500 mg bid x 5 d to [facility] [employer]

## 2020-01-02 ENCOUNTER — APPOINTMENT (OUTPATIENT)
Dept: GENERAL RADIOLOGY | Age: 45
End: 2020-01-02
Attending: PHYSICIAN ASSISTANT
Payer: MEDICARE

## 2020-01-02 ENCOUNTER — HOSPITAL ENCOUNTER (EMERGENCY)
Age: 45
Discharge: HOME | End: 2020-01-02
Attending: EMERGENCY MEDICINE
Payer: MEDICARE

## 2020-01-02 VITALS
BODY MASS INDEX: 23.04 KG/M2 | RESPIRATION RATE: 18 BRPM | HEIGHT: 63 IN | TEMPERATURE: 97.8 F | HEART RATE: 80 BPM | DIASTOLIC BLOOD PRESSURE: 85 MMHG | SYSTOLIC BLOOD PRESSURE: 139 MMHG | OXYGEN SATURATION: 99 % | WEIGHT: 130 LBS

## 2020-01-02 DIAGNOSIS — J18.9 COMMUNITY ACQUIRED PNEUMONIA OF LEFT LOWER LOBE OF LUNG: Primary | ICD-10-CM

## 2020-01-02 LAB
ALBUMIN SERPL-MCNC: 3.5 G/DL (ref 3.5–5)
ALBUMIN/GLOB SERPL: 0.8 {RATIO} (ref 1.1–2.2)
ALP SERPL-CCNC: 90 U/L (ref 45–117)
ALT SERPL-CCNC: 22 U/L (ref 12–78)
ANION GAP SERPL CALC-SCNC: 6 MMOL/L (ref 5–15)
AST SERPL-CCNC: 19 U/L (ref 15–37)
BASOPHILS # BLD: 0 K/UL (ref 0–0.1)
BASOPHILS NFR BLD: 0 % (ref 0–1)
BILIRUB DIRECT SERPL-MCNC: 0.1 MG/DL (ref 0–0.2)
BILIRUB SERPL-MCNC: 0.2 MG/DL (ref 0.2–1)
BUN SERPL-MCNC: 10 MG/DL (ref 6–20)
BUN/CREAT SERPL: 14 (ref 12–20)
CALCIUM SERPL-MCNC: 9.5 MG/DL (ref 8.5–10.1)
CHLORIDE SERPL-SCNC: 103 MMOL/L (ref 97–108)
CO2 SERPL-SCNC: 29 MMOL/L (ref 21–32)
CREAT SERPL-MCNC: 0.74 MG/DL (ref 0.55–1.02)
DIFFERENTIAL METHOD BLD: ABNORMAL
EOSINOPHIL # BLD: 0 K/UL (ref 0–0.4)
EOSINOPHIL NFR BLD: 0 % (ref 0–7)
ERYTHROCYTE [DISTWIDTH] IN BLOOD BY AUTOMATED COUNT: 11.7 % (ref 11.5–14.5)
GLOBULIN SER CALC-MCNC: 4.6 G/DL (ref 2–4)
GLUCOSE SERPL-MCNC: 89 MG/DL (ref 65–100)
HCG UR QL: NEGATIVE
HCT VFR BLD AUTO: 41.1 % (ref 35–47)
HGB BLD-MCNC: 13.8 G/DL (ref 11.5–16)
IMM GRANULOCYTES # BLD AUTO: 0 K/UL
IMM GRANULOCYTES NFR BLD AUTO: 0 %
LACTATE BLD-SCNC: 0.89 MMOL/L (ref 0.4–2)
LYMPHOCYTES # BLD: 5.1 K/UL (ref 0.8–3.5)
LYMPHOCYTES NFR BLD: 23 % (ref 12–49)
MCH RBC QN AUTO: 32.3 PG (ref 26–34)
MCHC RBC AUTO-ENTMCNC: 33.6 G/DL (ref 30–36.5)
MCV RBC AUTO: 96.3 FL (ref 80–99)
METAMYELOCYTES NFR BLD MANUAL: 1 %
MONOCYTES # BLD: 1.5 K/UL (ref 0–1)
MONOCYTES NFR BLD: 7 % (ref 5–13)
NEUTS SEG # BLD: 15.2 K/UL (ref 1.8–8)
NEUTS SEG NFR BLD: 69 % (ref 32–75)
NRBC # BLD: 0 K/UL (ref 0–0.01)
NRBC BLD-RTO: 0 PER 100 WBC
PLATELET # BLD AUTO: 575 K/UL (ref 150–400)
PMV BLD AUTO: 7.8 FL (ref 8.9–12.9)
POTASSIUM SERPL-SCNC: 3.6 MMOL/L (ref 3.5–5.1)
PROT SERPL-MCNC: 8.1 G/DL (ref 6.4–8.2)
RBC # BLD AUTO: 4.27 M/UL (ref 3.8–5.2)
RBC MORPH BLD: ABNORMAL
SODIUM SERPL-SCNC: 138 MMOL/L (ref 136–145)
WBC # BLD AUTO: 22 K/UL (ref 3.6–11)
WBC MORPH BLD: ABNORMAL

## 2020-01-02 PROCEDURE — 99283 EMERGENCY DEPT VISIT LOW MDM: CPT

## 2020-01-02 PROCEDURE — 83605 ASSAY OF LACTIC ACID: CPT

## 2020-01-02 PROCEDURE — 81025 URINE PREGNANCY TEST: CPT

## 2020-01-02 PROCEDURE — 85025 COMPLETE CBC W/AUTO DIFF WBC: CPT

## 2020-01-02 PROCEDURE — 80048 BASIC METABOLIC PNL TOTAL CA: CPT

## 2020-01-02 PROCEDURE — 80076 HEPATIC FUNCTION PANEL: CPT

## 2020-01-02 PROCEDURE — 71046 X-RAY EXAM CHEST 2 VIEWS: CPT

## 2020-01-02 PROCEDURE — 36415 COLL VENOUS BLD VENIPUNCTURE: CPT

## 2020-01-02 RX ORDER — DOXYCYCLINE HYCLATE 100 MG
100 TABLET ORAL 2 TIMES DAILY
Qty: 14 TAB | Refills: 0 | Status: SHIPPED | OUTPATIENT
Start: 2020-01-02 | End: 2020-01-09

## 2020-01-02 NOTE — DISCHARGE INSTRUCTIONS
Patient Education        Pneumonia: Care Instructions  Your Care Instructions    Pneumonia is an infection of the lungs. Most cases are caused by infections from bacteria or viruses. Pneumonia may be mild or very severe. If it is caused by bacteria, you will be treated with antibiotics. It may take a few weeks to a few months to recover fully from pneumonia, depending on how sick you were and whether your overall health is good. Follow-up care is a key part of your treatment and safety. Be sure to make and go to all appointments, and call your doctor if you are having problems. It's also a good idea to know your test results and keep a list of the medicines you take. How can you care for yourself at home? · Take your antibiotics exactly as directed. Do not stop taking the medicine just because you are feeling better. You need to take the full course of antibiotics. · Take your medicines exactly as prescribed. Call your doctor if you think you are having a problem with your medicine. · Get plenty of rest and sleep. You may feel weak and tired for a while, but your energy level will improve with time. · To prevent dehydration, drink plenty of fluids, enough so that your urine is light yellow or clear like water. Choose water and other caffeine-free clear liquids until you feel better. If you have kidney, heart, or liver disease and have to limit fluids, talk with your doctor before you increase the amount of fluids you drink. · Take care of your cough so you can rest. A cough that brings up mucus from your lungs is common with pneumonia. It is one way your body gets rid of the infection. But if coughing keeps you from resting or causes severe fatigue and chest-wall pain, talk to your doctor. He or she may suggest that you take a medicine to reduce the cough. · Use a vaporizer or humidifier to add moisture to your bedroom. Follow the directions for cleaning the machine.   · Do not smoke or allow others to smoke around you. Smoke will make your cough last longer. If you need help quitting, talk to your doctor about stop-smoking programs and medicines. These can increase your chances of quitting for good. · Take an over-the-counter pain medicine, such as acetaminophen (Tylenol), ibuprofen (Advil, Motrin), or naproxen (Aleve). Read and follow all instructions on the label. · Do not take two or more pain medicines at the same time unless the doctor told you to. Many pain medicines have acetaminophen, which is Tylenol. Too much acetaminophen (Tylenol) can be harmful. · If you were given a spirometer to measure how well your lungs are working, use it as instructed. This can help your doctor tell how your recovery is going. · To prevent pneumonia in the future, talk to your doctor about getting a flu vaccine (once a year) and a pneumococcal vaccine (one time only for most people). When should you call for help? Call 911 anytime you think you may need emergency care. For example, call if:    · You have severe trouble breathing.    Call your doctor now or seek immediate medical care if:    · You cough up dark brown or bloody mucus (sputum).     · You have new or worse trouble breathing.     · You are dizzy or lightheaded, or you feel like you may faint.    Watch closely for changes in your health, and be sure to contact your doctor if:    · You have a new or higher fever.     · You are coughing more deeply or more often.     · You are not getting better after 2 days (48 hours).     · You do not get better as expected. Where can you learn more? Go to http://shayan-jarek.info/. Enter [id number] in the search box to learn more about \"Pneumonia: Care Instructions. \"  Current as of: June 9, 2019  Content Version: 12.2  © 0225-4576 [employer]. Care instructions adapted under license by [employer] (which disclaims liability or warranty for this information).  If you have questions about a medical condition or this instruction, always ask your healthcare professional. [place] [address] any warranty or liability for your use of this information. We hope that we have addressed all of your medical concerns. The examination and treatment you received in the Emergency Department were for an emergent problem and were not intended as complete care. It is important that you follow up with your healthcare provider(s) for ongoing care. If your symptoms worsen or do not improve as expected, and you are unable to reach your usual health care provider(s), you should return to the Emergency Department. Today's healthcare is undergoing tremendous change, and patient satisfaction surveys are one of the many tools to assess the quality of medical care. You may receive a survey from the [employer] organization regarding your experience in the Emergency Department. I hope that your experience has been completely positive, particularly the medical care that I provided. As such, please participate in the survey; anything less than excellent does not meet my expectations or intentions. [facility] and [facility] participate in nationally recognized quality of care measures. If your blood pressure is greater than 120/80, as reported below, we urge that you seek medical care to address the potential of high blood pressure, commonly known as hypertension. Hypertension can be hereditary or can be caused by certain medical conditions, pain, stress, or \"white coat syndrome. \"       Please make an appointment with your health care provider(s) for follow up of your Emergency Department visit. VITALS:   Patient Vitals for the past 8 hrs:   Temp Pulse Resp BP SpO2   01/02/20 1430 -- 86 -- -- --   01/02/20 1302 97.8 °F (36.6 °C) (!) 106 16 148/70 99 %          Thank you for allowing us to provide you with medical care today.   We realize that you have many choices for your emergency care needs. Please choose us in the future for any continued health care needs. Bandar Maldonado PA-C    [address] [facility].   Office: [phone]            Recent Results (from the past 24 hour(s))   HCG URINE, QL. - POC    Collection Time: 01/02/20  1:44 PM   Result Value Ref Range    Pregnancy test,urine (POC) NEGATIVE  NEG     CBC WITH AUTOMATED DIFF    Collection Time: 01/02/20  1:45 PM   Result Value Ref Range    WBC 22.0 (H) 3.6 - 11.0 K/uL    RBC 4.27 3.80 - 5.20 M/uL    HGB 13.8 11.5 - 16.0 g/dL    HCT 41.1 35.0 - 47.0 %    MCV 96.3 80.0 - 99.0 FL    MCH 32.3 26.0 - 34.0 PG    MCHC 33.6 30.0 - 36.5 g/dL    RDW 11.7 11.5 - 14.5 %    PLATELET 813 (H) 745 - 400 K/uL    MPV 7.8 (L) 8.9 - 12.9 FL    NRBC 0.0 0  WBC    ABSOLUTE NRBC 0.00 0.00 - 0.01 K/uL    NEUTROPHILS 69 32 - 75 %    LYMPHOCYTES 23 12 - 49 %    MONOCYTES 7 5 - 13 %    EOSINOPHILS 0 0 - 7 %    BASOPHILS 0 0 - 1 %    METAMYELOCYTES 1 (H) 0 %    IMMATURE GRANULOCYTES 0 %    ABS. NEUTROPHILS 15.2 (H) 1.8 - 8.0 K/UL    ABS. LYMPHOCYTES 5.1 (H) 0.8 - 3.5 K/UL    ABS. MONOCYTES 1.5 (H) 0.0 - 1.0 K/UL    ABS. EOSINOPHILS 0.0 0.0 - 0.4 K/UL    ABS. BASOPHILS 0.0 0.0 - 0.1 K/UL    ABS. IMM. GRANS. 0.0 K/UL    DF MANUAL      RBC COMMENTS NORMOCYTIC, NORMOCHROMIC      WBC COMMENTS VACUOLATED POLYS     POC LACTIC ACID    Collection Time: 01/02/20  1:47 PM   Result Value Ref Range    Lactic Acid (POC) 0.89 0.40 - 2.00 mmol/L       Xr Chest Pa Lat    Result Date: 1/2/2020  INDICATION:  fever, cough, dx pna 1 week ago. EXAM: 2 VIEW CHEST RADIOGRAPH. COMPARISON: 12/26/2019. FINDINGS: Frontal and lateral views of the chest show persistent, probably minimally improved patchy opacity in the left lung base concerning for persistent or slightly improving focal infiltrate. There is no pleural effusion. . . The heart, mediastinum and pulmonary vasculature are stable. The bony thorax is unremarkable for age. .. IMPRESSION:  Probably minimally improved patchy left basilar opacity suggesting improving infiltrate. There is no new or worsening pneumonia. Follow-up to clearing again recommended in 4-6 weeks. .  .

## 2020-01-02 NOTE — ED TRIAGE NOTES
Pt arrives with the c/c of being dx with pneumonia a week ago, put on two antibiotics, followed up with pcp today and wbc elevated still pt reports still has cough and fever.

## 2020-01-02 NOTE — ED NOTES
Abbie MADDEN at bedside to review d/c instructions and prescription(s) with patient and self. Opportunity for clarification given. No further questions/concerns voiced at this time. Patient is alert and remains in no acute distress.

## 2020-01-02 NOTE — ED PROVIDER NOTES
Sid Seaman is a 53 yof with hx of Crohn's disease on Humira, bipolar disorder who p/w cough. Pt complains of cough with associated fever, and fatigue for the past 1.5 weeks that have persisted despite being on antibiotics for pneumonia. She was seen here on 12/26 and started on levaquin and keflex, which she completed today. Pt states she saw her PCP today for f/u and her WBC was 16 so she was referred to the ED. She denies ongoing fever but states cough and malaise have persisted. Note written by Ray Cadet, as dictated by Austin Tovar PA-C 1:01 PM        The history is provided by the patient.         Past Medical History:   Diagnosis Date    Bipolar 2 disorder ([address]     Colitis, ulcerative chronic (HCC)     Crohn disease ([address]     Depression     Epilepsy ([address]     Pancreatitis 9/13/2010    Seizure ([address]     Ulcerative colitis ([address] 9/13/2010       Past Surgical History:   Procedure Laterality Date    COLONOSCOPY N/A 6/21/2018    COLONOSCOPY performed by Lokesh Stoner MD at [facility] COLONOSCOPY N/A 7/3/2018    COLONOSCOPY performed by Danish Macario MD at [address] [place] [address] N/A 10/26/2016    FLEXIBLE SIGMOIDOSCOPY  performed by Danish Macario MD at [facility] HX APPENDECTOMY       [facility]    with J pouch    HX TONSIL AND ADENOIDECTOMY      age 6         Family History:   Family history unknown: Yes       Social History     Socioeconomic History    Marital status: SINGLE     Spouse name: Not on file    Number of children: Not on file    Years of education: Not on file    Highest education level: Not on file   Occupational History    Not on file   Social Needs    Financial resource strain: Not on file    Food insecurity:     Worry: Not on file     Inability: Not on file    Transportation needs:     Medical: Not on file     Non-medical: Not on file   Tobacco Use    Smoking status: Never Smoker    Smokeless tobacco: Never Used Substance and Sexual Activity    Alcohol use: No    Drug use: No    Sexual activity: Not on file   Lifestyle    Physical activity:     Days per week: Not on file     Minutes per session: Not on file    Stress: Not on file   Relationships    Social connections:     Talks on phone: Not on file     Gets together: Not on file     Attends Gnosticist service: Not on file     Active member of club or organization: Not on file     Attends meetings of clubs or organizations: Not on file     Relationship status: Not on file    Intimate partner violence:     Fear of current or ex partner: Not on file     Emotionally abused: Not on file     Physically abused: Not on file     Forced sexual activity: Not on file   Other Topics Concern    Not on file   Social History Narrative    Not on file         ALLERGIES: Vancomycin    Review of Systems   Constitutional: Negative for chills and fever. Eyes: Negative for photophobia. Respiratory: Positive for cough. Negative for shortness of breath. Cardiovascular: Negative for chest pain. Gastrointestinal: Negative for abdominal pain, nausea and vomiting. Musculoskeletal: Negative for joint swelling. Skin: Negative for rash. Neurological: Negative for syncope and weakness. There were no vitals filed for this visit. Physical Exam  Vitals signs and nursing note reviewed. Constitutional:       General: She is not in acute distress. Appearance: She is well-developed. She is not ill-appearing, toxic-appearing or diaphoretic. HENT:      Head: Normocephalic and atraumatic. Eyes:      General: No scleral icterus. Neck:      Musculoskeletal: Normal range of motion and neck supple. Cardiovascular:      Rate and Rhythm: Normal rate and regular rhythm. Pulses: Normal pulses. Pulmonary:      Effort: Pulmonary effort is normal. No respiratory distress. Breath sounds: No rhonchi. Skin:     General: Skin is warm and dry.       Capillary Refill: Capillary refill takes less than 2 seconds. Neurological:      Mental Status: She is alert and oriented to person, place, and time. Motor: No abnormal muscle tone. Psychiatric:         Mood and Affect: Mood normal.         Behavior: Behavior normal.          MDM  Number of Diagnoses or Management Options  Community acquired pneumonia of left lower lobe of lung [facility]):   Diagnosis management comments: Stable, well appearing. HR improved to 80s while in the ED. Her WBC returned at 22K but this is improved from 24K. Also noted reactive thrombocytosis today. CXR showed mildly improved LLL PNA. She completed her course of levofloxacin and cephalexin. Discussed with Dr. Shwan Cormier, who saw the patient, and we will start her on doxycycline given her ongoing symptoms moreso than her leukocytosis, which may be more reactive in the setting of her Humira therapy for Crohns. I do not suspect sepsis or other occult pathology. She agrees to f/u with PCP in 1 week and return here for increasing fever, shortness of breath, chest pain, vomiting or syncope. The patient was seen, examined, and the chart/vital signs were reviewed in the ER. Appropriate laboratory and imaging studies were obtained based on the patient's risk factors, history, and physical exam findings. The results were personally reviewed and interpreted and then reviewed with the patient/caregiver. Patient appears safe for discharge. Diagnosis and treatment plan explained in layman's terms. Counseled need for close f/u with PCP or specialist. Strict return precautions given. Patient verbalized understanding and agreement. All questions answered.            Procedures

## 2020-01-22 ENCOUNTER — HOSPITAL ENCOUNTER (EMERGENCY)
Age: 45
Discharge: HOME | End: 2020-01-22
Attending: EMERGENCY MEDICINE
Payer: MEDICARE

## 2020-01-22 ENCOUNTER — APPOINTMENT (OUTPATIENT)
Dept: GENERAL RADIOLOGY | Age: 45
End: 2020-01-22
Attending: PHYSICIAN ASSISTANT
Payer: MEDICARE

## 2020-01-22 ENCOUNTER — APPOINTMENT (OUTPATIENT)
Dept: CT IMAGING | Age: 45
End: 2020-01-22
Attending: PHYSICIAN ASSISTANT
Payer: MEDICARE

## 2020-01-22 VITALS
OXYGEN SATURATION: 100 % | BODY MASS INDEX: 23.92 KG/M2 | RESPIRATION RATE: 16 BRPM | TEMPERATURE: 98.5 F | WEIGHT: 135 LBS | SYSTOLIC BLOOD PRESSURE: 106 MMHG | HEIGHT: 63 IN | DIASTOLIC BLOOD PRESSURE: 59 MMHG | HEART RATE: 120 BPM

## 2020-01-22 DIAGNOSIS — R05.9 COUGH: Primary | ICD-10-CM

## 2020-01-22 DIAGNOSIS — R19.7 DIARRHEA, UNSPECIFIED TYPE: ICD-10-CM

## 2020-01-22 LAB
ALBUMIN SERPL-MCNC: 4 G/DL (ref 3.5–5)
ALBUMIN/GLOB SERPL: 1 {RATIO} (ref 1.1–2.2)
ALP SERPL-CCNC: 84 U/L (ref 45–117)
ALT SERPL-CCNC: 17 U/L (ref 12–78)
ANION GAP SERPL CALC-SCNC: 6 MMOL/L (ref 5–15)
APPEARANCE UR: CLEAR
AST SERPL-CCNC: 16 U/L (ref 15–37)
BACTERIA URNS QL MICRO: NEGATIVE /HPF
BASOPHILS # BLD: 0 K/UL (ref 0–0.1)
BASOPHILS NFR BLD: 0 % (ref 0–1)
BILIRUB SERPL-MCNC: 0.3 MG/DL (ref 0.2–1)
BILIRUB UR QL: NEGATIVE
BUN SERPL-MCNC: 14 MG/DL (ref 6–20)
BUN/CREAT SERPL: 20 (ref 12–20)
CALCIUM SERPL-MCNC: 9.4 MG/DL (ref 8.5–10.1)
CHLORIDE SERPL-SCNC: 104 MMOL/L (ref 97–108)
CO2 SERPL-SCNC: 26 MMOL/L (ref 21–32)
COLOR UR: ABNORMAL
COMMENT, HOLDF: NORMAL
CREAT SERPL-MCNC: 0.7 MG/DL (ref 0.55–1.02)
D DIMER PPP FEU-MCNC: 0.23 MG/L FEU (ref 0–0.65)
DIFFERENTIAL METHOD BLD: ABNORMAL
EOSINOPHIL # BLD: 0.2 K/UL (ref 0–0.4)
EOSINOPHIL NFR BLD: 1 % (ref 0–7)
EPITH CASTS URNS QL MICRO: ABNORMAL /LPF
ERYTHROCYTE [DISTWIDTH] IN BLOOD BY AUTOMATED COUNT: 12.1 % (ref 11.5–14.5)
FLUAV AG NPH QL IA: NEGATIVE
FLUBV AG NOSE QL IA: NEGATIVE
GLOBULIN SER CALC-MCNC: 4.1 G/DL (ref 2–4)
GLUCOSE SERPL-MCNC: 84 MG/DL (ref 65–100)
GLUCOSE UR STRIP.AUTO-MCNC: NEGATIVE MG/DL
HCG UR QL: NEGATIVE
HCT VFR BLD AUTO: 40.8 % (ref 35–47)
HGB BLD-MCNC: 13.7 G/DL (ref 11.5–16)
HGB UR QL STRIP: ABNORMAL
HYALINE CASTS URNS QL MICRO: ABNORMAL /LPF (ref 0–5)
IMM GRANULOCYTES # BLD AUTO: 0.1 K/UL (ref 0–0.04)
IMM GRANULOCYTES NFR BLD AUTO: 0 % (ref 0–0.5)
KETONES UR QL STRIP.AUTO: 40 MG/DL
LACTATE SERPL-SCNC: 0.9 MMOL/L (ref 0.4–2)
LEUKOCYTE ESTERASE UR QL STRIP.AUTO: ABNORMAL
LIPASE SERPL-CCNC: 58 U/L (ref 73–393)
LYMPHOCYTES # BLD: 3.3 K/UL (ref 0.8–3.5)
LYMPHOCYTES NFR BLD: 20 % (ref 12–49)
MCH RBC QN AUTO: 32.3 PG (ref 26–34)
MCHC RBC AUTO-ENTMCNC: 33.6 G/DL (ref 30–36.5)
MCV RBC AUTO: 96.2 FL (ref 80–99)
MONOCYTES # BLD: 1.1 K/UL (ref 0–1)
MONOCYTES NFR BLD: 7 % (ref 5–13)
NEUTS SEG # BLD: 11.9 K/UL (ref 1.8–8)
NEUTS SEG NFR BLD: 72 % (ref 32–75)
NITRITE UR QL STRIP.AUTO: NEGATIVE
NRBC # BLD: 0 K/UL (ref 0–0.01)
NRBC BLD-RTO: 0 PER 100 WBC
PH UR STRIP: 5 [PH] (ref 5–8)
PLATELET # BLD AUTO: 340 K/UL (ref 150–400)
PMV BLD AUTO: 8.7 FL (ref 8.9–12.9)
POTASSIUM SERPL-SCNC: 3.8 MMOL/L (ref 3.5–5.1)
PROT SERPL-MCNC: 8.1 G/DL (ref 6.4–8.2)
PROT UR STRIP-MCNC: NEGATIVE MG/DL
RBC # BLD AUTO: 4.24 M/UL (ref 3.8–5.2)
RBC #/AREA URNS HPF: ABNORMAL /HPF (ref 0–5)
SAMPLES BEING HELD,HOLD: NORMAL
SODIUM SERPL-SCNC: 136 MMOL/L (ref 136–145)
SP GR UR REFRACTOMETRY: 1.02 (ref 1–1.03)
TROPONIN I SERPL-MCNC: <0.05 NG/ML
UR CULT HOLD, URHOLD: NORMAL
UROBILINOGEN UR QL STRIP.AUTO: 0.2 EU/DL (ref 0.2–1)
WBC # BLD AUTO: 16.6 K/UL (ref 3.6–11)
WBC URNS QL MICRO: ABNORMAL /HPF (ref 0–4)

## 2020-01-22 PROCEDURE — 80053 COMPREHEN METABOLIC PANEL: CPT

## 2020-01-22 PROCEDURE — 87040 BLOOD CULTURE FOR BACTERIA: CPT

## 2020-01-22 PROCEDURE — 83690 ASSAY OF LIPASE: CPT

## 2020-01-22 PROCEDURE — 83605 ASSAY OF LACTIC ACID: CPT

## 2020-01-22 PROCEDURE — 81025 URINE PREGNANCY TEST: CPT

## 2020-01-22 PROCEDURE — 36415 COLL VENOUS BLD VENIPUNCTURE: CPT

## 2020-01-22 PROCEDURE — 87804 INFLUENZA ASSAY W/OPTIC: CPT

## 2020-01-22 PROCEDURE — 74177 CT ABD & PELVIS W/CONTRAST: CPT

## 2020-01-22 PROCEDURE — 81001 URINALYSIS AUTO W/SCOPE: CPT

## 2020-01-22 PROCEDURE — 84484 ASSAY OF TROPONIN QUANT: CPT

## 2020-01-22 PROCEDURE — 96360 HYDRATION IV INFUSION INIT: CPT

## 2020-01-22 PROCEDURE — 99284 EMERGENCY DEPT VISIT MOD MDM: CPT

## 2020-01-22 PROCEDURE — 71275 CT ANGIOGRAPHY CHEST: CPT

## 2020-01-22 PROCEDURE — 96361 HYDRATE IV INFUSION ADD-ON: CPT

## 2020-01-22 PROCEDURE — 74011250636 HC RX REV CODE- 250/636: Performed by: EMERGENCY MEDICINE

## 2020-01-22 PROCEDURE — 85379 FIBRIN DEGRADATION QUANT: CPT

## 2020-01-22 PROCEDURE — 74011636320 HC RX REV CODE- 636/320: Performed by: RADIOLOGY

## 2020-01-22 PROCEDURE — 74011250636 HC RX REV CODE- 250/636: Performed by: PHYSICIAN ASSISTANT

## 2020-01-22 PROCEDURE — 71046 X-RAY EXAM CHEST 2 VIEWS: CPT

## 2020-01-22 PROCEDURE — 85025 COMPLETE CBC W/AUTO DIFF WBC: CPT

## 2020-01-22 PROCEDURE — 93005 ELECTROCARDIOGRAM TRACING: CPT

## 2020-01-22 RX ORDER — ONDANSETRON 2 MG/ML
4 INJECTION INTRAMUSCULAR; INTRAVENOUS
Status: DISCONTINUED | OUTPATIENT
Start: 2020-01-22 | End: 2020-01-23 | Stop reason: HOSPADM

## 2020-01-22 RX ORDER — BENZONATATE 100 MG/1
100 CAPSULE ORAL
Qty: 30 CAP | Refills: 0 | Status: SHIPPED | OUTPATIENT
Start: 2020-01-22 | End: 2020-01-29

## 2020-01-22 RX ADMIN — SODIUM CHLORIDE 1000 ML: 900 INJECTION, SOLUTION INTRAVENOUS at 22:07

## 2020-01-22 RX ADMIN — IOPAMIDOL 100 ML: 755 INJECTION, SOLUTION INTRAVENOUS at 21:01

## 2020-01-22 RX ADMIN — SODIUM CHLORIDE 1000 ML: 900 INJECTION, SOLUTION INTRAVENOUS at 19:51

## 2020-01-22 NOTE — ED PROVIDER NOTES
Pt c/o ongoing cough for a couple of weeks. Pt was diagnosed with pneumonia on 12/26/19; she has been on 3 rounds of antibiotics. Pt reports chest pain, described as \"heaviness\" for 2 days. Pt also reports she is having 8-9 episodes of diarrhea per day for 1 week. I have evaluated the patient as the Provider in Triage. I have reviewed Her vital signs and the triage nurse assessment. I have talked with the patient and any available family and advised that I am the provider in triage and have ordered the appropriate study to initiate their work up based on the clinical presentation during my assessment. I have advised that the patient will be accommodated in the Main ED as soon as possible. I have also requested to contact the triage nurse or myself immediately if the patient experiences any changes in their condition during this brief waiting period. Note written by Ray López, as dictated by Gualberto Alford PA-C 5:06 PM  ----------------------------------------------------------------------------------------------------    53 y.o. female with past medical history significant for ulcerative colitis, pancreatitis, crohn disease, depression, epilepsy, and bipolar 2 disorder who presents from home via personal vehicle with chief complaint of cough. Pt reports she had the onset of a fever of 101 degrees, chest tightness, and coughing last night. Pt states she feels chest pain when she breathes in. Today, the pt met with her PCP who told the pt she wasn't sure if she was experiencing these symptoms because her crohn's disease is flaring up but she referred the pt to [facility] ED because she had a slightly elevated white blood cell count. Additionally, the pt notes her symptoms feel similar to when she was seen on 12/26/2019 in [facility] ED and says she finished her last dose of ABX that were given to her the previous time a week and a half ago.  Pt discloses she is on humera for her crohn's disease and notes she got her flu shot this year. Pt denies history of blood clots and being around other individuals who may be sick. Pt specifically affirms: fever, chest tightness, chest pain, coughing, diarrhea, SOB, and abdominal pain. Pt specifically denies: rhinorrhea, blood in stool, and dysuria. There are no other acute medical concerns at this time. PCP: Luz Maria Lipscomb MD    Old Chart Review:  Pt was seen on 12/26/2019 and was diagnosed with pneumonia. Pt came back on 1/2/2020 for similar symptoms and was given doxycyclin and keflex. Pt notes she had been taking her medications as directed and was feeling good but says her symptoms arose again today. Note written by Ray Riddle, as dictated by Carlos Luna DO 7:37 PM              The history is provided by the patient. No  was used.         Past Medical History:   Diagnosis Date    Bipolar 2 disorder ([address]     Colitis, ulcerative chronic (HCC)     Crohn disease ([address]     Depression     [facility]     Pancreatitis 9/13/2010    Seizure ([address]     Ulcerative colitis ([address] 9/13/2010       Past Surgical History:   Procedure Laterality Date    COLONOSCOPY N/A 6/21/2018    COLONOSCOPY performed by Harish Pitts MD at [address] [place] [address] COLONOSCOPY N/A 7/3/2018    COLONOSCOPY performed by Grazyna Delogn MD at [place] [address] N/A 10/26/2016    FLEXIBLE SIGMOIDOSCOPY  performed by Grazyna Delong MD at [address] [place] [address] HX APPENDECTOMY       [facility]    with J pouch    HX TONSIL AND ADENOIDECTOMY      age 6         Family History:   Family history unknown: Yes       Social History     Socioeconomic History    Marital status: SINGLE     Spouse name: Not on file    Number of children: Not on file    Years of education: Not on file    Highest education level: Not on file   Occupational History    Not on file   Social Needs    Financial resource strain: Not on file    Food insecurity:     Worry: Not on file     Inability: Not on file    Transportation needs:     Medical: Not on file     Non-medical: Not on file   Tobacco Use    Smoking status: Never Smoker    Smokeless tobacco: Never Used   Substance and Sexual Activity    Alcohol use: No    Drug use: No    Sexual activity: Not on file   Lifestyle    Physical activity:     Days per week: Not on file     Minutes per session: Not on file    Stress: Not on file   Relationships    Social connections:     Talks on phone: Not on file     Gets together: Not on file     Attends Mormonism service: Not on file     Active member of club or organization: Not on file     Attends meetings of clubs or organizations: Not on file     Relationship status: Not on file    Intimate partner violence:     Fear of current or ex partner: Not on file     Emotionally abused: Not on file     Physically abused: Not on file     Forced sexual activity: Not on file   Other Topics Concern    Not on file   Social History Narrative    Not on file         ALLERGIES: Vancomycin    Review of Systems   Constitutional: Positive for fever ( 101 degrees). Negative for activity change, appetite change and chills. HENT: Negative for congestion, rhinorrhea, sinus pressure, sneezing and sore throat. Eyes: Negative for photophobia and visual disturbance. Respiratory: Positive for cough, chest tightness and shortness of breath. Cardiovascular: Positive for chest pain. Gastrointestinal: Positive for abdominal pain and diarrhea. Negative for blood in stool, constipation, nausea and vomiting. Genitourinary: Negative for difficulty urinating, dysuria, flank pain, frequency, hematuria, menstrual problem, urgency, vaginal bleeding and vaginal discharge. Musculoskeletal: Negative for arthralgias, back pain, myalgias and neck pain. Skin: Negative for rash and wound. Neurological: Negative for syncope, weakness, numbness and headaches.    Psychiatric/Behavioral: Negative for self-injury and suicidal ideas. All other systems reviewed and are negative. Vitals:    01/22/20 1702   BP: 123/72   Pulse: (!) 109   Resp: 16   Temp: 98.5 °F (36.9 °C)   SpO2: 100%   Weight: 61.2 kg (135 lb)   Height: 5' 3\" (1.6 m)            Physical Exam  Vitals signs and nursing note reviewed. Constitutional:       General: She is not in acute distress. Appearance: Normal appearance. She is well-developed. She is not diaphoretic. Comments: Speaking in full sentences. HENT:      Head: Normocephalic and atraumatic. Nose: Nose normal.   Eyes:      Extraocular Movements: Extraocular movements intact. Conjunctiva/sclera: Conjunctivae normal.      Pupils: Pupils are equal, round, and reactive to light. Neck:      Musculoskeletal: Neck supple. Cardiovascular:      Rate and Rhythm: Regular rhythm. Tachycardia present. Heart sounds: Normal heart sounds. Pulmonary:      Effort: Pulmonary effort is normal.      Breath sounds: Normal breath sounds. Abdominal:      General: There is no distension. Palpations: Abdomen is soft. Tenderness: There is generalized tenderness (mild). Musculoskeletal:      Comments: No lower extremity swelling or tenderness. Skin:     General: Skin is warm and dry. Neurological:      General: No focal deficit present. Mental Status: She is alert and oriented to person, place, and time. Cranial Nerves: No cranial nerve deficit. Sensory: No sensory deficit. Motor: No weakness. Coordination: Coordination normal.     Note written by Ray Cheung, as dictated by Daquan Hernandez DO 7:37 PM       MDM   78-year-old female presents with fever and cough and chest tightness since last night. Symptoms similar to previous symptoms with pneumonia concern for recurrent pneumonia given immunocompromised status on Humira.  Also developing generalized abd pain and diarrhea concern for possible abdominal etiology for her sx vs crohns flare. Labs show WBC of 16.6, improved from prior measures but still elevated. Labs otherwise reassuring, negative troponin and d-dimer, normal lactic acid. UA shows nitrite negative, with 20-50 WBCs but moderate epithelial contamination with negative bacteria. No urinary symptoms. EGD negative. CXR viewed by myself and read by radiology showing no acute abnormalities. CT chest and abd/plv were ordered to further evaluate. While awaiting imaging results her care was signed out to Dr. Jamar Cochran at 9:30 PM.      Influenza swab also ordered, and given a second liter    Please see his note for further information regarding the remainder of her care while in the ED. Procedures    1730 EKG shows normal sinus rhythm with a rate of 100 bpm, evidence of prior septal infarct but no acute ST or T wave abnormalities suggestive of ischemia.

## 2020-01-22 NOTE — ED TRIAGE NOTES
Pt arrives with the c/c of being dx with pneumonia on December 26th and has been on three rounds of antibiotics to treat and reports still has a cough and intermittent fever, pt also reports has had diarrhea about 8 or 9 a day.

## 2020-01-23 LAB
ATRIAL RATE: 100 BPM
CALCULATED P AXIS, ECG09: 69 DEGREES
CALCULATED R AXIS, ECG10: -25 DEGREES
CALCULATED T AXIS, ECG11: 53 DEGREES
DIAGNOSIS, 93000: NORMAL
P-R INTERVAL, ECG05: 160 MS
Q-T INTERVAL, ECG07: 354 MS
QRS DURATION, ECG06: 76 MS
QTC CALCULATION (BEZET), ECG08: 456 MS
VENTRICULAR RATE, ECG03: 100 BPM

## 2020-01-23 NOTE — DISCHARGE INSTRUCTIONS
Patient Education        Cough: Care Instructions  Your Care Instructions    A cough is your body's response to something that bothers your throat or airways. Many things can cause a cough. You might cough because of a cold or the flu, bronchitis, or asthma. Smoking, postnasal drip, allergies, and stomach acid that backs up into your throat also can cause coughs. A cough is a symptom, not a disease. Most coughs stop when the cause, such as a cold, goes away. You can take a few steps at home to cough less and feel better. Follow-up care is a key part of your treatment and safety. Be sure to make and go to all appointments, and call your doctor if you are having problems. It's also a good idea to know your test results and keep a list of the medicines you take. How can you care for yourself at home? · Drink lots of water and other fluids. This helps thin the mucus and soothes a dry or sore throat. Honey or lemon juice in hot water or tea may ease a dry cough. · Take cough medicine as directed by your doctor. · Prop up your head on pillows to help you breathe and ease a dry cough. · Try cough drops to soothe a dry or sore throat. Cough drops don't stop a cough. Medicine-flavored cough drops are no better than candy-flavored drops or hard candy. · Do not smoke. Avoid secondhand smoke. If you need help quitting, talk to your doctor about stop-smoking programs and medicines. These can increase your chances of quitting for good. When should you call for help? Call 911 anytime you think you may need emergency care.  For example, call if:    · You have severe trouble breathing.    Call your doctor now or seek immediate medical care if:    · You cough up blood.     · You have new or worse trouble breathing.     · You have a new or higher fever.     · You have a new rash.    Watch closely for changes in your health, and be sure to contact your doctor if:    · You cough more deeply or more often, especially if you notice more mucus or a change in the color of your mucus.     · You have new symptoms, such as a sore throat, an earache, or sinus pain.     · You do not get better as expected. Where can you learn more? Go to http://shayan-jarek.info/. Enter D279 in the search box to learn more about \"Cough: Care Instructions. \"  Current as of: June 9, 2019  Content Version: 12.2  © 4103-5902 [employer]. Care instructions adapted under license by [employer] (which disclaims liability or warranty for this information). If you have questions about a medical condition or this instruction, always ask your healthcare professional. [address] any warranty or liability for your use of this information.

## 2020-01-23 NOTE — ED NOTES
9:58 PM  Patient signed out to me pending flu swab by Dr. Tanya Estrada. If positive will treat with Tamiflu. Otherwise will discharge to follow-up with primary care doctor.  -Flu swab returned negative.  -Advised to follow-up closely with primary care doctor. Given return precautions.

## 2020-01-27 LAB
BACTERIA SPEC CULT: NORMAL
SERVICE CMNT-IMP: NORMAL

## 2021-05-17 ENCOUNTER — CONTACT LENS EXAM NEW (OUTPATIENT)
Dept: URBAN - METROPOLITAN AREA CLINIC 35 | Facility: CLINIC | Age: 46
End: 2021-05-17

## 2021-05-17 DIAGNOSIS — H52.02: ICD-10-CM

## 2021-05-17 DIAGNOSIS — H52.203: ICD-10-CM

## 2021-05-17 DIAGNOSIS — H52.4: ICD-10-CM

## 2021-05-17 DIAGNOSIS — H52.11: ICD-10-CM

## 2021-05-17 PROCEDURE — 92015 DETERMINE REFRACTIVE STATE: CPT

## 2021-05-17 PROCEDURE — 92310-3 LEVEL 3 CONTACT LENS MANAGEMENT

## 2021-05-17 PROCEDURE — 92004 COMPRE OPH EXAM NEW PT 1/>: CPT

## 2021-05-17 ASSESSMENT — VISUAL ACUITY
OD_CC: 20/30
OD_CC: J4
OS_CC: 20/25
OS_CC: J3
OD_SC: 20/60
OS_SC: 20/50

## 2021-05-17 ASSESSMENT — TONOMETRY
OD_IOP_MMHG: 14
OS_IOP_MMHG: 14

## 2021-05-24 ENCOUNTER — CONTACT LENS FOLLOW UP (OUTPATIENT)
Dept: URBAN - METROPOLITAN AREA CLINIC 35 | Facility: CLINIC | Age: 46
End: 2021-05-24

## 2021-05-24 DIAGNOSIS — H52.203: ICD-10-CM

## 2021-05-24 DIAGNOSIS — H52.11: ICD-10-CM

## 2021-05-24 DIAGNOSIS — H52.02: ICD-10-CM

## 2021-05-24 DIAGNOSIS — H52.4: ICD-10-CM

## 2021-05-24 PROCEDURE — 92310F

## 2021-06-07 ENCOUNTER — CONTACT LENS FOLLOW UP (OUTPATIENT)
Dept: URBAN - METROPOLITAN AREA CLINIC 35 | Facility: CLINIC | Age: 46
End: 2021-06-07

## 2021-06-07 ASSESSMENT — VISUAL ACUITY
OU_CC: 20/20 CLS
OD_CC: 20/25 CLS
OU_CC: J6 CLS
OS_CC: J8 CLS
OS_CC: 20/25 CLS
OD_CC: J10 CLS

## (undated) DEVICE — 1200 GUARD II KIT W/5MM TUBE W/O VAC TUBE: Brand: GUARDIAN

## (undated) DEVICE — SIMPLICITY FLUFF UNDERPAD 23X36, MODERATE: Brand: SIMPLICITY

## (undated) DEVICE — KIT COLON W/ 1.1OZ LUB AND 2 END

## (undated) DEVICE — KENDALL RADIOLUCENT FOAM MONITORING ELECTRODE -RECTANGULAR SHAPE: Brand: KENDALL

## (undated) DEVICE — BAG BELONG PT PERS CLEAR HANDL

## (undated) DEVICE — CATH IV AUTOGRD BC PNK 20GA 25 -- INSYTE

## (undated) DEVICE — Device

## (undated) DEVICE — FORCEPS BX L240CM JAW DIA2.8MM L CAP W/ NDL MIC MESH TOOTH

## (undated) DEVICE — CONTAINER SPEC 20 ML LID NEUT BUFF FORMALIN 10 % POLYPR STS

## (undated) DEVICE — ADULT SPO2 SENSOR: Brand: NELLCOR

## (undated) DEVICE — CANN NASAL O2 CAPNOGRAPHY AD -- FILTERLINE

## (undated) DEVICE — SOLIDIFIER MEDC 1200ML -- CONVERT TO 356117

## (undated) DEVICE — SET GRAV CK VLV NEEDLESS ST 3 GANGED 4WAY STPCOCK HI FLO 10

## (undated) DEVICE — 3M™ CUROS™ DISINFECTING CAP FOR NEEDLELESS CONNECTORS 270/CARTON 20 CARTONS/CASE CFF1-270: Brand: CUROS™

## (undated) DEVICE — SET ADMIN 16ML TBNG L100IN 2 Y INJ SITE IV PIGGY BK DISP

## (undated) DEVICE — BAG SPEC BIOHZRD 10 X 10 IN --

## (undated) DEVICE — ESOPHAGEAL BALLOON DILATATION CATHETER: Brand: CRE FIXED WIRE